# Patient Record
Sex: FEMALE | Race: WHITE | Employment: UNEMPLOYED | URBAN - METROPOLITAN AREA
[De-identification: names, ages, dates, MRNs, and addresses within clinical notes are randomized per-mention and may not be internally consistent; named-entity substitution may affect disease eponyms.]

---

## 2017-04-14 ENCOUNTER — TELEPHONE (OUTPATIENT)
Dept: NURSING | Facility: CLINIC | Age: 32
End: 2017-04-14

## 2017-04-14 DIAGNOSIS — B00.9 HSV (HERPES SIMPLEX VIRUS) INFECTION: ICD-10-CM

## 2017-04-14 RX ORDER — VALACYCLOVIR HYDROCHLORIDE 1 G/1
TABLET, FILM COATED ORAL
Qty: 12 TABLET | Refills: 2 | Status: SHIPPED | OUTPATIENT
Start: 2017-04-14 | End: 2017-07-19

## 2017-04-14 NOTE — TELEPHONE ENCOUNTER
Valacyclovir 1000mg      Last Written Prescription Date:  10/16/15  Last Fill Quantity: 12,   # refills: 2  Last Office Visit with [facility] primary care provider:  10/28/16-PP visit  Future Office visit: none    Pt up to date with annual, Rx refilled.

## 2017-07-19 DIAGNOSIS — B00.9 HSV (HERPES SIMPLEX VIRUS) INFECTION: ICD-10-CM

## 2017-07-20 NOTE — TELEPHONE ENCOUNTER
Pending Prescriptions:                       Disp   Refills    valACYclovir (VALTREX) 1000 mg tablet [Pha*12 tab*0        Sig: TAKE 2 TABLETS BY MOUTH EVERY 12 HOURS FOR 3 DAYS    Last Written Prescription Date: 04/14/17  Last Fill Quantity: 12, # refills: 2  Last Office Visit with FMKIANNA, RAHEEMP or [facility] prescribing provider: 10/28/16      Future visit: None

## 2017-07-20 NOTE — TELEPHONE ENCOUNTER
Would like prescription sent to this pharmacy   [id number] [place] - [id number]

## 2017-07-24 NOTE — TELEPHONE ENCOUNTER
Last visit was pt's postpartum on 10/28/16. Rx last prescribed for Valtrex 1000 MG, 12 tabs/2rf on 4/14/17.     LM to call back (PHI on consent) asking to call back to see if she has used all the refills already?

## 2017-07-27 NOTE — TELEPHONE ENCOUNTER
Pending Prescriptions:                       Disp   Refills    valACYclovir (VALTREX) 1000 mg tablet [Ph*12 tab*0            Sig: TAKE 2 TABLETS BY MOUTH EVERY 12 HOURS FOR 3 DAYS       Last Written Prescription Date: 4/14/17  Last Fill Quantity: 12, # refills: 2  Last Office Visit with [facility], [facility] or [facility] prescribing provider: 10/28/2016 POST PARTUM   RTC: None

## 2017-07-27 NOTE — TELEPHONE ENCOUNTER
[phone]. Please call patient asap.   She still has not received script. Needs to be sent to pharmacy in [place] now.

## 2017-07-27 NOTE — TELEPHONE ENCOUNTER
Pt called to see if she had used all of her refills of valacyclovir. Pt stated she had lost one of her bottles so needs to have another refill. Pt is requesting to go from 12 pill to 24 pill. Pt states that when she has break out she uses all 12 pills and then has to go back to the pharmacy. Pt states she is more prone to cold sore in the summer with the sun exposure. Routing to Dr. James. Ok to send refill for 24 tabs ? How many refills would you like included.

## 2017-07-28 RX ORDER — VALACYCLOVIR HYDROCHLORIDE 1 G/1
TABLET, FILM COATED ORAL
Qty: 24 TABLET | Refills: 1 | Status: SHIPPED | OUTPATIENT
Start: 2017-07-28 | End: 2018-01-28

## 2017-10-18 ENCOUNTER — ALLIED HEALTH/NURSE VISIT (OUTPATIENT)
Dept: NURSING | Facility: CLINIC | Age: 32
End: 2017-10-18
Payer: COMMERCIAL

## 2017-10-18 DIAGNOSIS — Z23 NEED FOR PROPHYLACTIC VACCINATION AND INOCULATION AGAINST INFLUENZA: Primary | ICD-10-CM

## 2017-10-18 PROCEDURE — 90686 IIV4 VACC NO PRSV 0.5 ML IM: CPT

## 2017-10-18 PROCEDURE — 90471 IMMUNIZATION ADMIN: CPT

## 2017-10-18 NOTE — MR AVS SNAPSHOT
"              After Visit Summary   10/18/2017    Mayelin Alexander    MRN: [id number]           Patient Information     Date Of Birth          1985        Visit Information        Provider Department      10/18/2017 10:15 AM [facility] CLINIC NURSE [facility]        Today's Diagnoses     Need for prophylactic vaccination and inoculation against influenza    -  1       Follow-ups after your visit        Who to contact     If you have questions or need follow up information about today's clinic visit or your schedule please contact [facility] directly at [phone].  Normal or non-critical lab and imaging results will be communicated to you by [email or web address], letter or phone within 4 business days after the clinic has received the results. If you do not hear from us within 7 days, please contact the clinic through Sonarworkst or phone. If you have a critical or abnormal lab result, we will notify you by phone as soon as possible.  Submit refill requests through Cyber-Rain or call your pharmacy and they will forward the refill request to us. Please allow 3 business days for your refill to be completed.          Additional Information About Your Visit        MyChart Information     Cyber-Rain lets you send messages to your doctor, view your test results, renew your prescriptions, schedule appointments and more. To sign up, go to [email or web address] . Click on \"Log in\" on the left side of the screen, which will take you to the Welcome page. Then click on \"Sign up Now\" on the right side of the page.     You will be asked to enter the access code listed below, as well as some personal information. Please follow the directions to create your username and password.     Your access code is: [email or web address]  Expires: 2018 10:32 AM     Your access code will  in 90 days. If you need help or a new code, please call your [facility] or [phone].        Care EveryWhere ID     This is " your Care EveryWhere ID. This could be used by other organizations to access your [facility] medical records  [id number]         Blood Pressure from Last 3 Encounters:   10/14/16 116/64   09/12/16 100/65   09/06/16 104/68    Weight from Last 3 Encounters:   10/28/16 137 lb (62.1 kg)   10/14/16 140 lb (63.5 kg)   09/10/16 160 lb (72.6 kg)              We Performed the Following     FLU VAC, SPLIT VIRUS IM > 3 YO (QUADRIVALENT) [88969]     Vaccine Administration, Initial [23831]        Primary Care Provider Office Phone # Fax #    Rach James [phone] [address] [address]        Equal Access to Services     ANDREY PASCUAL : Hadii jessica dickerson hadasho Soomaali, waaxda luqadaha, qaybta kaalmada adeegyada, vanessa sterling . So [facility] [phone].    ATENCIÓN: Si habla español, tiene a daniels disposición servicios gratuitos de asistencia lingüística. Llame al [phone].    We comply with applicable federal civil rights laws and [place] laws. We do not discriminate on the basis of race, color, national origin, age, disability, sex, sexual orientation, or gender identity.            Thank you!     Thank you for choosing [facility]  for your care. Our goal is always to provide you with excellent care. Hearing back from our patients is one way we can continue to improve our services. Please take a few minutes to complete the written survey that you may receive in the mail after your visit with us. Thank you!             Your Updated Medication List - Protect others around you: Learn how to safely use, store and throw away your medicines at www.disposemymeds.org.          This list is accurate as of: 10/18/17 10:32 AM.  Always use your most recent med list.                   Brand Name Dispense Instructions for use Diagnosis    FOLIC ACID PO      Take 1 mg by mouth daily        OMEGA-3 FISH OIL PO           prenatal multivitamin plus iron  27-0.8 MG Tabs per tablet      Take 1 tablet by mouth daily.        valACYclovir 1000 mg tablet    VALTREX    24 tablet    TAKE 2 TABLETS BY MOUTH EVERY 12 HOURS FOR 3 DAYS    HSV (herpes simplex virus) infection       VITAMIN D3 PO      Take by mouth daily

## 2017-10-18 NOTE — PROGRESS NOTES
Injectable Influenza Immunization Documentation    1.  Is the person to be vaccinated sick today?   No    2. Does the person to be vaccinated have an allergy to a component   of the vaccine?   No    3. Has the person to be vaccinated ever had a serious reaction   to influenza vaccine in the past?   No    4. Has the person to be vaccinated ever had Guillain-Barré syndrome?   No    Form completed by Lesley Mackenzie [facility]

## 2018-01-28 DIAGNOSIS — B00.9 HSV (HERPES SIMPLEX VIRUS) INFECTION: ICD-10-CM

## 2018-01-29 RX ORDER — VALACYCLOVIR HYDROCHLORIDE 1 G/1
TABLET, FILM COATED ORAL
Qty: 24 TABLET | Refills: 0 | Status: SHIPPED | OUTPATIENT
Start: 2018-01-29 | End: 2018-06-27

## 2018-01-29 NOTE — TELEPHONE ENCOUNTER
valACYclovir (VALTREX) 1000 mg tablet    Last Written Prescription Date:  7/28/17  Last Fill Quantity: 24,   # refills: 1  Last Office Visit with G primary care provider:  10/28/16  Future Office visit: none    Routing refill request to provider for review/approval because:  Pt due for annual. One month extension sent.

## 2018-06-27 DIAGNOSIS — B00.9 HSV (HERPES SIMPLEX VIRUS) INFECTION: ICD-10-CM

## 2018-06-28 RX ORDER — VALACYCLOVIR HYDROCHLORIDE 1 G/1
1000 TABLET, FILM COATED ORAL EVERY 12 HOURS
Qty: 24 TABLET | Refills: 0 | Status: SHIPPED | OUTPATIENT
Start: 2018-06-28 | End: 2018-08-22

## 2018-06-28 NOTE — TELEPHONE ENCOUNTER
"Requested Prescriptions   Pending Prescriptions Disp Refills     valACYclovir (VALTREX) 1000 mg tablet [Pharmacy Med Name: VALACYCLOVIR 1GM TABLETS] 24 tablet 0     Sig: TAKE 2 TABLETS BY MOUTH EVERY 12 HOURS FOR 3 DAYS    Antivirals for Herpes Protocol Failed    6/28/2018  9:12 AM       Failed - Recent (12 mo) or future (30 days) visit within the authorizing provider's specialty    Patient had office visit in the last 12 months or has a visit in the next 30 days with authorizing provider or within the authorizing provider's specialty.  See \"Patient Info\" tab in inbasket, or \"Choose Columns\" in Meds & Orders section of the refill encounter.           Failed - Normal serum creatinine on file in past 12 months    No lab results found.         Passed - Patient is age 12 or older      Valtrex 1000mg   Last Written Prescription Date:  1/29/18  Last Fill Quantity: 24,  # refills: 0   Last office visit: 10/18/2017 with prescribing provider:     Future Office Visit: 8/1/18 annual  Next 5 appointments (look out 90 days)     Aug 01, 2018  2:00 PM CDT   PHYSICAL with Rach James MD   [facility]    [address] [place] [id number] [phone]               Medication is being filled for 1 time refill only due to:  Patient needs to be seen because it has been more than one year since last visit.  "

## 2018-08-22 ENCOUNTER — OFFICE VISIT (OUTPATIENT)
Dept: OBGYN | Facility: CLINIC | Age: 33
End: 2018-08-22
Payer: COMMERCIAL

## 2018-08-22 VITALS
HEART RATE: 80 BPM | WEIGHT: 132.6 LBS | DIASTOLIC BLOOD PRESSURE: 62 MMHG | BODY MASS INDEX: 21.31 KG/M2 | HEIGHT: 66 IN | SYSTOLIC BLOOD PRESSURE: 102 MMHG

## 2018-08-22 DIAGNOSIS — B00.9 HSV (HERPES SIMPLEX VIRUS) INFECTION: ICD-10-CM

## 2018-08-22 DIAGNOSIS — N39.3 SUI (STRESS URINARY INCONTINENCE, FEMALE): ICD-10-CM

## 2018-08-22 DIAGNOSIS — Z01.419 ENCOUNTER FOR GYNECOLOGICAL EXAMINATION WITHOUT ABNORMAL FINDING: Primary | ICD-10-CM

## 2018-08-22 PROCEDURE — 99395 PREV VISIT EST AGE 18-39: CPT | Performed by: OBSTETRICS & GYNECOLOGY

## 2018-08-22 RX ORDER — VALACYCLOVIR HYDROCHLORIDE 1 G/1
1000 TABLET, FILM COATED ORAL EVERY 12 HOURS
Qty: 24 TABLET | Refills: 3 | Status: SHIPPED | OUTPATIENT
Start: 2018-08-22 | End: 2019-10-25

## 2018-08-22 ASSESSMENT — ANXIETY QUESTIONNAIRES
5. BEING SO RESTLESS THAT IT IS HARD TO SIT STILL: NOT AT ALL
IF YOU CHECKED OFF ANY PROBLEMS ON THIS QUESTIONNAIRE, HOW DIFFICULT HAVE THESE PROBLEMS MADE IT FOR YOU TO DO YOUR WORK, TAKE CARE OF THINGS AT HOME, OR GET ALONG WITH OTHER PEOPLE: NOT DIFFICULT AT ALL
GAD7 TOTAL SCORE: 1
3. WORRYING TOO MUCH ABOUT DIFFERENT THINGS: NOT AT ALL
7. FEELING AFRAID AS IF SOMETHING AWFUL MIGHT HAPPEN: NOT AT ALL
1. FEELING NERVOUS, ANXIOUS, OR ON EDGE: NOT AT ALL
6. BECOMING EASILY ANNOYED OR IRRITABLE: SEVERAL DAYS
2. NOT BEING ABLE TO STOP OR CONTROL WORRYING: NOT AT ALL

## 2018-08-22 ASSESSMENT — PATIENT HEALTH QUESTIONNAIRE - PHQ9: 5. POOR APPETITE OR OVEREATING: NOT AT ALL

## 2018-08-22 NOTE — PROGRESS NOTES
Mayelin is a 33 year old  female who presents for annual exam.     Besides routine health maintenance, she has no other health concerns today .    HPI:  Here today for yearly exam --doing well.  Missed annual exam last year.  Regular, monthly menses x 6d.  A bit heavier for the first 3-4d and then tapers quickly.  No intermenstrual bleeding/spotting.  Some cramping.  Occ has bladder symptoms during menses.  +SA --no issues.   has had vasectomy.  No bowel concerns.  Continues to have incontinence issues.  Better than the 6 months following her 2nd and 3rd deliveries but still an issue.  +leaking with exercise, jumping/playing with kids, cough/sneeze, etc.  Wears a liner for pilates class.  No urgency.  Getting 1-2x/night --usually once solely to void.  Has tried PT in the past but has not stuck with it or continued exercises.      ; 3 kids --Cale is 7yo! Hobnuha is almost 5yo and Verónica is 1yo!!  Stay at home mom  -staying active; busy with kids and usually working out 2x/wk with yoga/pilates, light cardio  +SBE --no concerns  PCP -[facility] --sees as needed  -hx cold sores --uses valtrex as needed  -headed out to [place] and [place] [facility] next week for concert!!  First extended trip away from kids :)      GYNECOLOGIC HISTORY:    Patient's last menstrual period was 2018 (exact date).  Her current contraception method is: none.  She  reports that she has never smoked. She has never used smokeless tobacco.    Patient is sexually active.  STD testing offered?  Declined  Last PHQ-9 score on record =   PHQ-9 SCORE 2018   Total Score 2     Last GAD7 score on record =   CECILIO-7 SCORE 2018   Total Score 1     Alcohol Score = 4    HEALTH MAINTENANCE:  Cholesterol: (No results found for: CHOL   Last Mammo: NA, Result: not applicable, Next Mammo: 5 years.   Pap: 2015 Neg, HPV Neg  Colonoscopy:  NA, Result: not applicable, Next Colonoscopy: 15 years.  Dexa:  NA    Health  maintenance updated:  yes    HISTORY:  Obstetric History       T3      L2     SAB0   TAB0   Ectopic0   Multiple0   Live Births2       # Outcome Date GA Lbr Morgan/2nd Weight Sex Delivery Anes PTL Lv   3 Term 09/10/16 39w4d 03:30 / 02:56 7 lb 9 oz (3.43 kg) F  EPI        Name: Verónica Shrestha      Apgar1:  9                Apgar5: 9   2 Term 13 39w4d 03:35 / 03:20 8 lb 1.5 oz (3.67 kg) M  EPI  [place]      Name: Sharla      Apgar1:  9                Apgar5: 9   1 Term 12 40w5d 08:00 / 06:13 9 lb 13 oz (4.451 kg) M CS-LTranv EPI N [place]      Name: Cale      Apgar1:  5                Apgar5: 9          Patient Active Problem List   Diagnosis     CARDIOVASCULAR SCREENING; LDL GOAL LESS THAN 160     Eczema     Cold sore     Past Surgical History:   Procedure Laterality Date      SECTION  2012    Procedure: SECTION; Surgeon:JOESPH STOUT; Location: L+D      Social History   Substance Use Topics     Smoking status: Never Smoker     Smokeless tobacco: Never Used     Alcohol use Yes      Problem (# of Occurrences) Relation (Name,Age of Onset)    Breast Cancer (1) Paternal Grandmother    HEART DISEASE (1) Paternal Grandfather    Hyperlipidemia (1) Paternal Grandfather    Osteoperosis (1) Maternal Grandfather    Ovarian Cancer (1) Other (aunt)    Thyroid Disease (1) Paternal Grandmother            Current Outpatient Prescriptions   Medication Sig     Prenatal Vit-Fe Fumarate-FA (PRENATAL MULTIVITAMIN  PLUS IRON) 27-0.8 MG TABS Take 1 tablet by mouth daily.     valACYclovir (VALTREX) 1000 mg tablet Take 1 tablet (1,000 mg) by mouth every 12 hours NO further refills until seen in clinic     [DISCONTINUED] valACYclovir (VALTREX) 1000 mg tablet Take 1 tablet (1,000 mg) by mouth every 12 hours NO further refills until seen in clinic     No current facility-administered medications for this visit.      Facility-Administered Medications Ordered in Other Visits   Medication      "lidocaine-EPINEPHrine 1.5 %-1:152182 injection     ROPivacaine (NAROPIN) epidural     No Known Allergies    Past medical, surgical, social and family histories were reviewed and updated in EPIC.    ROS:   12 point review of systems negative other than symptoms noted below.  Gastrointestinal: Abdominal Pain and Bloating  Genitourinary: Incontinence and Urgency    EXAM:  /62  Pulse 80  Ht 5' 5.75\" (1.67 m)  Wt 132 lb 9.6 oz (60.1 kg)  LMP 07/30/2018 (Exact Date)  Breastfeeding? No  BMI 21.57 kg/m2   BMI: Body mass index is 21.57 kg/(m^2).    PHYSICAL EXAM:  Constitutional:  Appearance: Well nourished, well developed, alert, in no acute distress  Neck:  Lymph Nodes:  No lymphadenopathy present    Thyroid:  Gland size normal, nontender, no nodules or masses present  on palpation  Chest:  Respiratory Effort:  Breathing unlabored  Cardiovascular:    Heart: Auscultation:  Regular rate, normal rhythm, no murmurs present  Breasts: Inspection of Breasts:  No lymphadenopathy present., Palpation of Breasts and Axillae:  No masses present on palpation, no breast tenderness., Axillary Lymph Nodes:  No lymphadenopathy present. and No nodularity, asymmetry or nipple discharge bilaterally.  Gastrointestinal:   Abdominal Examination:  Abdomen nontender to palpation, tone normal without rigidity or guarding, no masses present, umbilicus without lesions   Liver and Spleen:  No hepatomegaly present, liver nontender to palpation    Hernias:  No hernias present  Lymphatic: Lymph Nodes:  No other lymphadenopathy present  Skin:  General Inspection:  No rashes present, no lesions present, no areas of  discoloration    Genitalia and Groin:  No rashes present, no lesions present, no areas of  discoloration, no masses present  Neurologic/Psychiatric:    Mental Status:  Oriented X3     Pelvic Exam:  External Genitalia:     Normal appearance for age, no discharge present, no tenderness present, no inflammatory lesions present, color " normal  Vagina:     Normal vaginal vault without central or paravaginal defects, no discharge present, no inflammatory lesions present, no masses present  Bladder:     Nontender to palpation  Urethra:   Urethral Body:  Urethra palpation normal, urethra structural support normal   Urethral Meatus:  No erythema or lesions present  Cervix:     Appearance healthy, no lesions present, nontender to palpation, no bleeding present  Uterus:     Uterus: firm, normal sized and nontender, anteverted in position.   Adnexa:     No adnexal tenderness present, no adnexal masses present  Perineum:     Perineum within normal limits, no evidence of trauma, no rashes or skin lesions present  Anus:     Anus within normal limits, no hemorrhoids present  Inguinal Lymph Nodes:     No lymphadenopathy present  Pubic Hair:     Normal pubic hair distribution for age  Genitalia and Groin:     No rashes present, no lesions present, no areas of discoloration, no masses present      COUNSELING:   Reviewed preventive health counseling, as reflected in patient instructions  Special attention given to:        Regular exercise       Healthy diet/nutrition    BMI: Body mass index is 21.57 kg/(m^2).      ASSESSMENT:  33 year old female with satisfactory annual exam.    ICD-10-CM    1. Encounter for gynecological examination without abnormal finding Z01.419 Pap imaged thin layer screen with HPV - recommended age 30 - 65     HPV High Risk Types DNA Cervical   2. HSV (herpes simplex virus) infection B00.9 valACYclovir (VALTREX) 1000 mg tablet       PLAN:  Patient Instructions   Follow up with your primary care provider for your other medical problems.  Continue self breast exam.  Increase physical activity and exercise.  Usual safety and preventative measures counseling done.  Last pap smear (2015) was normal and negative for the DNA of high risk HPV subtypes.  No pap was obtained this year.  This was discussed with the patient and she agrees with the  plan.  Discussed MEMO again in detail; discussed possible treatment options including PT, home exercises, bladder training and midurethral sling.  Understands the importance of maximizing our non-surgical options prior to surgical treatment given her younger age.  Will contact us as needed.    Rach James MD

## 2018-08-22 NOTE — PATIENT INSTRUCTIONS
Follow up with your primary care provider for your other medical problems.  Continue self breast exam.  Increase physical activity and exercise.  Usual safety and preventative measures counseling done.  Last pap smear (2015) was normal and negative for the DNA of high risk HPV subtypes.  No pap was obtained this year.  This was discussed with the patient and she agrees with the plan.

## 2018-08-22 NOTE — MR AVS SNAPSHOT
After Visit Summary   8/22/2018    Mayelin Alexander    MRN: [id number]           Patient Information     Date Of Birth          1985        Visit Information        Provider Department      8/22/2018 1:50 PM Rach James MD [facility]        Today's Diagnoses     Encounter for gynecological examination without abnormal finding    -  1    HSV (herpes simplex virus) infection        MEMO (stress urinary incontinence, female)          Care Instructions    Follow up with your primary care provider for your other medical problems.  Continue self breast exam.  Increase physical activity and exercise.  Usual safety and preventative measures counseling done.  Last pap smear (2015) was normal and negative for the DNA of high risk HPV subtypes.  No pap was obtained this year.  This was discussed with the patient and she agrees with the plan.          Follow-ups after your visit        Follow-up notes from your care team     Return in about 1 year (around 8/22/2019) for Annual Exam.      Who to contact     If you have questions or need follow up information about today's clinic visit or your schedule please contact [facility] directly at [phone].  Normal or non-critical lab and imaging results will be communicated to you by MyChart, letter or phone within 4 business days after the clinic has received the results. If you do not hear from us within 7 days, please contact the clinic through MyChart or phone. If you have a critical or abnormal lab result, we will notify you by phone as soon as possible.  Submit refill requests through Terascala or call your pharmacy and they will forward the refill request to us. Please allow 3 business days for your refill to be completed.          Additional Information About Your Visit        Care EveryWhere ID     This is your Care EveryWhere ID. This could be used by other organizations to access your [facility]  "records  [id number]        Your Vitals Were     Pulse Height Last Period Breastfeeding? BMI (Body Mass Index)       80 5' 5.75\" (1.67 m) 07/30/2018 (Exact Date) No 21.57 kg/m2        Blood Pressure from Last 3 Encounters:   08/22/18 102/62   10/14/16 116/64   09/12/16 100/65    Weight from Last 3 Encounters:   08/22/18 132 lb 9.6 oz (60.1 kg)   10/28/16 137 lb (62.1 kg)   10/14/16 140 lb (63.5 kg)              We Performed the Following     HPV High Risk Types DNA Cervical     Pap imaged thin layer screen with HPV - recommended age 30 - 65          Where to get your medicines      These medications were sent to [facility] [address] [facility] [address] [place] [address] [address] AT [facility] [address], [facility] [place] [address]    Hours:  24-hours Phone:  [phone]     valACYclovir 1000 mg tablet          Primary Care Provider Office Phone # Fax #    [facility] [phone]       [address] [place] [id number]        Equal Access to Services     ANDREY PASCUAL AH: Hadii jessica dickerson hadasho Soomaali, waaxda luqadaha, qaybta kaalmada adeegyada, vanessa gonzalez. So [facility] [phone].    ATENCIÓN: Si habla español, tiene a daniels disposición servicios gratuitos de asistencia lingüística. [facility] [phone].    We comply with applicable federal civil rights laws and [place] laws. We do not discriminate on the basis of race, color, national origin, age, disability, sex, sexual orientation, or gender identity.            Thank you!     Thank you for choosing [facility]  for your care. Our goal is always to provide you with excellent care. Hearing back from our patients is one way we can continue to improve our services. Please take a few minutes to complete the written survey that you may receive in the mail after your visit with us. Thank you!             Your Updated Medication List - Protect others around you: " Learn how to safely use, store and throw away your medicines at www.disposemymeds.org.          This list is accurate as of 8/22/18  5:48 PM.  Always use your most recent med list.                   Brand Name Dispense Instructions for use Diagnosis    prenatal multivitamin plus iron 27-0.8 MG Tabs per tablet      Take 1 tablet by mouth daily.        valACYclovir 1000 mg tablet    VALTREX    24 tablet    Take 1 tablet (1,000 mg) by mouth every 12 hours NO further refills until seen in clinic    HSV (herpes simplex virus) infection

## 2018-08-24 ASSESSMENT — PATIENT HEALTH QUESTIONNAIRE - PHQ9: SUM OF ALL RESPONSES TO PHQ QUESTIONS 1-9: 2

## 2018-08-24 ASSESSMENT — ANXIETY QUESTIONNAIRES: GAD7 TOTAL SCORE: 1

## 2018-10-15 ENCOUNTER — OFFICE VISIT (OUTPATIENT)
Dept: FAMILY MEDICINE | Facility: CLINIC | Age: 33
End: 2018-10-15
Payer: COMMERCIAL

## 2018-10-15 DIAGNOSIS — R30.0 DYSURIA: ICD-10-CM

## 2018-10-15 DIAGNOSIS — L30.9 ECZEMA, UNSPECIFIED TYPE: ICD-10-CM

## 2018-10-15 DIAGNOSIS — L30.9 DERMATITIS: ICD-10-CM

## 2018-10-15 DIAGNOSIS — N39.0 URINARY TRACT INFECTION WITHOUT HEMATURIA, SITE UNSPECIFIED: Primary | ICD-10-CM

## 2018-10-15 LAB
ALBUMIN UR-MCNC: NEGATIVE MG/DL
APPEARANCE UR: CLEAR
BACTERIA #/AREA URNS HPF: ABNORMAL /HPF
BILIRUB UR QL STRIP: NEGATIVE
COLOR UR AUTO: YELLOW
GLUCOSE UR STRIP-MCNC: NEGATIVE MG/DL
HGB UR QL STRIP: ABNORMAL
KETONES UR STRIP-MCNC: NEGATIVE MG/DL
LEUKOCYTE ESTERASE UR QL STRIP: ABNORMAL
NITRATE UR QL: NEGATIVE
PH UR STRIP: 7.5 PH (ref 5–7)
RBC #/AREA URNS AUTO: ABNORMAL /HPF
SOURCE: ABNORMAL
SP GR UR STRIP: 1.01 (ref 1–1.03)
UROBILINOGEN UR STRIP-ACNC: 0.2 EU/DL (ref 0.2–1)
WBC #/AREA URNS AUTO: ABNORMAL /HPF

## 2018-10-15 PROCEDURE — 87088 URINE BACTERIA CULTURE: CPT | Performed by: FAMILY MEDICINE

## 2018-10-15 PROCEDURE — 99213 OFFICE O/P EST LOW 20 MIN: CPT | Performed by: FAMILY MEDICINE

## 2018-10-15 PROCEDURE — 87086 URINE CULTURE/COLONY COUNT: CPT | Performed by: FAMILY MEDICINE

## 2018-10-15 PROCEDURE — 81001 URINALYSIS AUTO W/SCOPE: CPT | Performed by: FAMILY MEDICINE

## 2018-10-15 PROCEDURE — 87186 SC STD MICRODIL/AGAR DIL: CPT | Performed by: FAMILY MEDICINE

## 2018-10-15 RX ORDER — BETAMETHASONE DIPROPIONATE 0.5 MG/G
CREAM TOPICAL
Qty: 15 G | Refills: 0 | Status: SHIPPED | OUTPATIENT
Start: 2018-10-15 | End: 2018-11-27

## 2018-10-15 RX ORDER — SULFAMETHOXAZOLE/TRIMETHOPRIM 800-160 MG
1 TABLET ORAL 2 TIMES DAILY
Qty: 14 TABLET | Refills: 0 | Status: SHIPPED | OUTPATIENT
Start: 2018-10-15 | End: 2018-11-27

## 2018-10-15 NOTE — PROGRESS NOTES
SUBJECTIVE:                                                      Mayelin Alexander is a 33 year old female who presents to clinic today for the following health issues:    URINARY TRACT SYMPTOMS  Onset: 1 day    Description:   Painful urination (Dysuria): YES  Blood in urine (Hematuria): YES  Delay in urine (Hesitency): no     Intensity: moderate    Progression of Symptoms:  worsening    Accompanying Signs & Symptoms:  Fever/chills: YES- last night maybe. Subjective fever   Flank pain YES- left   Nausea and vomiting: no   Any vaginal symptoms: none  Abdominal/Pelvic Pain: YES    History:   History of frequent UTI's: YES  History of kidney stones: no   Sexually Active: YES  Possibility of pregnancy: No    Precipitating factors:       Therapies Tried and outcome: ibuprofen    Patient's last UTI was 3-4 years ago but she has had 4-5 in her life. Patient reports it took multiple antibiotics to treat at that time.     Rash:  Notices rash on bilateral feet, describes it as burning and itching, more so at night. She has not noticed a rash anywhere else on her body. She returned two weeks ago from being out of the country and is wondering if the rash could have developed there and is concerned about bed bugs. She did not wear new shoes while on her trip but first noticed the rash where her shoes were rubbing her feet. She has used an anti-fungal and hydrocortisone with some symptom relief but rash has not resolved. She has been using a moisturizer.     Problem list and histories reviewed & adjusted, as indicated.  Additional history: as documented    ROS:  Constitutional, HEENT, cardiovascular, pulmonary, GI, , musculoskeletal, neuro, skin, endocrine and psych systems are negative, except as otherwise noted.    This document serves as a record of the services and decisions personally performed by JUAN WHEATLEY. It was created on his/her behalf by Conner Bolton, a trained medical scribe. The creation of this  document is based on the provider's statements to the medical scribe. Conner Bolton, October 15, 2018 9:28 AM  OBJECTIVE:                                                      There were no vitals taken for this visit. There is no height or weight on file to calculate BMI.   GENERAL: healthy, alert, well nourished, well hydrated, no distress  RESP: lungs clear to auscultation - no rales, no rhonchi, no wheezes  CV: regular rates and rhythm, normal S1 S2, no S3 or S4 and no murmur, no click or rub -  ABDOMEN: soft, suprapubic tenderness, no  hepatosplenomegaly, no masses, normal bowel sounds  SKIN: multiple slight pink rased papules on the the posterior heels  BACK: no CVA tenderness, no paralumbar tenderness  PSYCH: Alert and oriented times 3; speech- coherent , normal rate and volume; able to articulate logical thoughts, able to abstract reason, no tangential thoughts, no hallucinations or delusions, affect- normal    Diagnostic test results:  Results for orders placed or performed in visit on 10/15/18 (from the past 24 hour(s))   *UA reflex to Microscopic and Culture ([place] and [facility] (except [place] and [place])   Result Value Ref [place]    Color Urine Yellow     Appearance Urine Clear     Glucose Urine Negative NEG^Negative mg/dL    Bilirubin Urine Negative NEG^Negative    Ketones Urine Negative NEG^Negative mg/dL    Specific Gravity Urine 1.010 1.003 - 1.035    Blood Urine Large (A) NEG^Negative    pH Urine 7.5 (H) 5.0 - 7.0 pH    Protein Albumin Urine Negative NEG^Negative mg/dL    Urobilinogen Urine 0.2 0.2 - 1.0 EU/dL    Nitrite Urine Negative NEG^Negative    Leukocyte Esterase Urine Small (A) NEG^Negative    Source Midstream Urine    Urine Microscopic   Result Value Ref [place]    WBC Urine 10-25 (A) OTO5^0 - 5 /HPF    RBC Urine 10-25 (A) OTO2^O - 2 /HPF    Bacteria Urine Moderate (A) NEG^Negative /HPF       ASSESSMENT/PLAN:                                                      Mayelin was seen  today for uti.    Diagnoses and all orders for this visit:    Urinary tract infection without hematuria, site unspecified  -     sulfamethoxazole-trimethoprim (BACTRIM DS/SEPTRA DS) 800-160 MG per tablet; Take 1 tablet by mouth 2 times daily    Discussed medication schedule, UTI prevention. Follow-up warnings provided   -     *UA reflex to Microscopic and Culture ([place] and [facility] (except [place] and [place])  -     Urine Microscopic  UC pending    Dermatitis of heels / eczema hands  -     betamethasone dipropionate (DIPROSONE) 0.05 % cream; Apply sparingly to affected area twice daily for 14 days.  Do not apply to face.          Risks, benefits and alternatives of treatments discussed. Plan agreed on.      Followup: Return in about 5 days (around 10/20/2018), or if symptoms worsen or fail to improve.    See patient instructions.     The information in this document, created by the medical scribe for me, accurately reflects the services I personally performed and the decisions made by me. I have reviewed and approved this document for accuracy.            Jeffry Soares MD   Pager: [phone]

## 2018-10-15 NOTE — PATIENT INSTRUCTIONS
Use the antibiotic twice daily for at least 5 days. If you are feeling better at day 5 you can stop or you can take all 7 days.

## 2018-10-15 NOTE — LETTER
[facility]  [address] [place] [address]                  [phone]   October 19, 2018    Mayelin Alexander  [address] [address]      Dear Mayelin,    Here is a summary of your recent test results:    -Urine culture is abnormal and grew out bacteria that are sensitive to the antibiotic you have been given.  Complete the medication as prescribed and if you experience new, worsening or persistent symptoms, you should call or return for a recheck.     For additional lab test information, labtestsonline.org is an excellent reference.     Your test results are enclosed.      Please contact me if you have any questions.    In addition, here is a list of due or overdue Health Maintenance reminders.    Health Maintenance Due   Topic Date Due     Cholesterol Lab - yearly  08/19/1984     Flu Vaccine (1) 09/01/2018       Please call us at [phone] (or use "[employer] to address the above recommendations.            Thank you very much for trusting [facility]..     Healthy regards,          Jeffry Soares M.D.        Results for orders placed or performed in visit on 10/15/18   *UA reflex to Microscopic and Culture ([place] and [facility] (except [place] and [place])   Result Value Ref [place]    Color Urine Yellow     Appearance Urine Clear     Glucose Urine Negative NEG^Negative mg/dL    Bilirubin Urine Negative NEG^Negative    Ketones Urine Negative NEG^Negative mg/dL    Specific Gravity Urine 1.010 1.003 - 1.035    Blood Urine Large (A) NEG^Negative    pH Urine 7.5 (H) 5.0 - 7.0 pH    Protein Albumin Urine Negative NEG^Negative mg/dL    Urobilinogen Urine 0.2 0.2 - 1.0 EU/dL    Nitrite Urine Negative NEG^Negative    Leukocyte Esterase Urine Small (A) NEG^Negative    Source Midstream Urine    Urine Microscopic   Result Value Ref [place]    WBC Urine 10-25 (A) OTO5^0 - 5 /HPF    RBC Urine 10-25 (A) OTO2^O - 2 /HPF    Bacteria  Urine Moderate (A) NEG^Negative /HPF   Urine Culture Aerobic Bacterial   Result Value Ref [facility]    Specimen Description Midstream Urine     Culture Micro >100,000 colonies/mL  Escherichia coli   (A)        Susceptibility    Escherichia coli - PAMELA     AMPICILLIN <=2 Sensitive ug/mL     CEFAZOLIN* <=4 Sensitive ug/mL      * Cefazolin PAMELA breakpoints are for the treatment of uncomplicated urinary tract infections.  For the treatment of systemic infections, please contact the laboratory for additional testing.     CEFOXITIN <=4 Sensitive ug/mL     CEFTAZIDIME <=1 Sensitive ug/mL     CEFTRIAXONE <=1 Sensitive ug/mL     CIPROFLOXACIN <=0.25 Sensitive ug/mL     GENTAMICIN <=1 Sensitive ug/mL     LEVOFLOXACIN <=0.12 Sensitive ug/mL     NITROFURANTOIN <=16 Sensitive ug/mL     TOBRAMYCIN <=1 Sensitive ug/mL     Trimethoprim/Sulfa <=1/19 Sensitive ug/mL     AMPICILLIN/SULBACTAM <=2 Sensitive ug/mL     Piperacillin/Tazo <=4 Sensitive ug/mL     CEFEPIME <=1 Sensitive ug/mL

## 2018-10-15 NOTE — MR AVS SNAPSHOT
After Visit Summary   10/15/2018    Mayelin Alexander    MRN: [id number]           Patient Information     Date Of Birth          1985        Visit Information        Provider Department      10/15/2018 9:00 AM Naldo Soares MD [facility]        Today's Diagnoses     Urinary tract infection without hematuria, site unspecified    -  1    Dysuria        Dermatitis          Care Instructions    Use the antibiotic twice daily for at least 5 days. If you are feeling better at day 5 you can stop or you can take all 7 days.           Follow-ups after your visit        Follow-up notes from your care team     Return in about 5 days (around 10/20/2018), or if symptoms worsen or fail to improve.      Who to contact     If you have questions or need follow up information about today's clinic visit or your schedule please contact [facility] directly at [phone].  Normal or non-critical lab and imaging results will be communicated to you by MyChart, letter or phone within 4 business days after the clinic has received the results. If you do not hear from us within 7 days, please contact the clinic through MyChart or phone. If you have a critical or abnormal lab result, we will notify you by phone as soon as possible.  Submit refill requests through [employer] or call your pharmacy and they will forward the refill request to us. Please allow 3 business days for your refill to be completed.          Additional Information About Your Visit        Care EveryWhere ID     This is your Care EveryWhere ID. This could be used by other organizations to access your [place] medical records  [id number]         Blood Pressure from Last 3 Encounters:   08/22/18 102/62   10/14/16 116/64   09/12/16 100/65    Weight from Last 3 Encounters:   08/22/18 132 lb 9.6 oz (60.1 kg)   10/28/16 137 lb (62.1 kg)   10/14/16 140 lb (63.5 kg)              We Performed the Following     *UA reflex  to Microscopic and Culture ([place] and [facility] (except [place] and [place])     Urine Culture Aerobic Bacterial     Urine Microscopic          Today's Medication Changes          These changes are accurate as of 10/15/18  9:39 AM.  If you have any questions, ask your nurse or doctor.               Start taking these medicines.        Dose/Directions    betamethasone dipropionate 0.05 % cream   Commonly known as:  DIPROSONE   Used for:  Dermatitis   Started by:  Naldo Soares MD        Apply sparingly to affected area twice daily for 14 days.  Do not apply to face.   Quantity:  15 g   Refills:  0       sulfamethoxazole-trimethoprim 800-160 MG per tablet   Commonly known as:  BACTRIM DS/SEPTRA DS   Used for:  Urinary tract infection without hematuria, site unspecified   Started by:  Naldo Soares MD        Dose:  1 tablet   Take 1 tablet by mouth 2 times daily   Quantity:  14 tablet   Refills:  0            Where to get your medicines      These medications were sent to [facility] [address] [place] [address] AT [facility] [address] [address]    Hours:  24-hours Phone:  [phone]     betamethasone dipropionate 0.05 % cream    sulfamethoxazole-trimethoprim 800-160 MG per tablet                Primary Care Provider Office Phone # Fax #    [facility] [phone]       [address] [place] [address]        Equal Access to Services     ANDREY PASCUAL AH: Hadii jessica dickerson hadasho Soomaali, waaxda luqadaha, qaybta kaalmada adeegyada, vanessa sterling . So [facility] [phone].    ATENCIÓN: Si habla español, tiene a daniels disposición servicios gratuitos de asistencia lingüística. Rashad al [phone].    We comply with applicable federal civil rights laws and [place] laws. We do not discriminate on the basis of race, color, national origin, age, disability, sex, sexual  orientation, or gender identity.            Thank you!     Thank you for choosing [facility]  for your care. Our goal is always to provide you with excellent care. Hearing back from our patients is one way we can continue to improve our services. Please take a few minutes to complete the written survey that you may receive in the mail after your visit with us. Thank you!             Your Updated Medication List - Protect others around you: Learn how to safely use, store and throw away your medicines at www.disposemymeds.org.          This list is accurate as of 10/15/18  9:39 AM.  Always use your most recent med list.                   Brand Name Dispense Instructions for use Diagnosis    betamethasone dipropionate 0.05 % cream    DIPROSONE    15 g    Apply sparingly to affected area twice daily for 14 days.  Do not apply to face.    Dermatitis       prenatal multivitamin plus iron 27-0.8 MG Tabs per tablet      Take 1 tablet by mouth daily.        sulfamethoxazole-trimethoprim 800-160 MG per tablet    BACTRIM DS/SEPTRA DS    14 tablet    Take 1 tablet by mouth 2 times daily    Urinary tract infection without hematuria, site unspecified       valACYclovir 1000 mg tablet    VALTREX    24 tablet    Take 1 tablet (1,000 mg) by mouth every 12 hours NO further refills until seen in clinic    HSV (herpes simplex virus) infection

## 2018-10-16 LAB
BACTERIA SPEC CULT: ABNORMAL
SPECIMEN SOURCE: ABNORMAL

## 2018-10-18 NOTE — PROGRESS NOTES
Note to Staff: please call the patient to explain results. and call the patient to check on current symptoms.    -Urine culture is abnormal and grew out bacteria that are sensitive to the antibiotic you have been given.  Complete the medication as prescribed and if you experience new, worsening or persistent symptoms, you should call or return for a recheck.     For additional lab test information, labtestsonline.org is an excellent reference.

## 2018-10-22 ENCOUNTER — TELEPHONE (OUTPATIENT)
Dept: FAMILY MEDICINE | Facility: CLINIC | Age: 33
End: 2018-10-22

## 2018-10-22 NOTE — TELEPHONE ENCOUNTER
Reason for Call:  Other returning call    Detailed comments: Pt returning call for triage    Phone Number Patient can be reached at: Home number on file [phone] (home)    Best Time:   anytime    Can we leave a detailed message on this number? YES    Call taken on 10/22/2018 at 9:42 AM by Machelel Porras

## 2018-10-23 NOTE — TELEPHONE ENCOUNTER
Patient returned call.  Patient completed antibiotics and is not experiencing any other symptoms.    Jewels THOMASON RN  [employer] Triage

## 2018-11-26 ENCOUNTER — NURSE TRIAGE (OUTPATIENT)
Dept: NURSING | Facility: CLINIC | Age: 33
End: 2018-11-26

## 2018-11-27 ENCOUNTER — TELEPHONE (OUTPATIENT)
Dept: FAMILY MEDICINE | Facility: CLINIC | Age: 33
End: 2018-11-27

## 2018-11-27 ENCOUNTER — OFFICE VISIT (OUTPATIENT)
Dept: FAMILY MEDICINE | Facility: CLINIC | Age: 33
End: 2018-11-27
Payer: COMMERCIAL

## 2018-11-27 VITALS
WEIGHT: 134.4 LBS | BODY MASS INDEX: 22.39 KG/M2 | HEIGHT: 65 IN | TEMPERATURE: 99.4 F | SYSTOLIC BLOOD PRESSURE: 102 MMHG | DIASTOLIC BLOOD PRESSURE: 72 MMHG | OXYGEN SATURATION: 97 % | HEART RATE: 106 BPM

## 2018-11-27 DIAGNOSIS — Z87.440 RECENT URINARY TRACT INFECTION: ICD-10-CM

## 2018-11-27 DIAGNOSIS — H65.92 OME (OTITIS MEDIA WITH EFFUSION), LEFT: ICD-10-CM

## 2018-11-27 DIAGNOSIS — R35.0 URINARY FREQUENCY: ICD-10-CM

## 2018-11-27 DIAGNOSIS — R05.9 COUGH: Primary | ICD-10-CM

## 2018-11-27 DIAGNOSIS — J02.9 SORE THROAT: ICD-10-CM

## 2018-11-27 DIAGNOSIS — H66.92 OTITIS MEDIA TREATED WITH ANTIBIOTICS IN THE PAST 60 DAYS, LEFT: ICD-10-CM

## 2018-11-27 LAB
ALBUMIN UR-MCNC: 30 MG/DL
APPEARANCE UR: CLEAR
BILIRUB UR QL STRIP: NEGATIVE
COLOR UR AUTO: YELLOW
DEPRECATED S PYO AG THROAT QL EIA: NORMAL
FLUAV+FLUBV AG SPEC QL: NEGATIVE
FLUAV+FLUBV AG SPEC QL: NEGATIVE
GLUCOSE UR STRIP-MCNC: NEGATIVE MG/DL
HGB UR QL STRIP: ABNORMAL
KETONES UR STRIP-MCNC: >=80 MG/DL
LEUKOCYTE ESTERASE UR QL STRIP: NEGATIVE
MUCOUS THREADS #/AREA URNS LPF: PRESENT /LPF
NITRATE UR QL: NEGATIVE
NON-SQ EPI CELLS #/AREA URNS LPF: ABNORMAL /LPF
PH UR STRIP: 6 PH (ref 5–7)
RBC #/AREA URNS AUTO: ABNORMAL /HPF
SOURCE: ABNORMAL
SP GR UR STRIP: 1.02 (ref 1–1.03)
SPECIMEN SOURCE: NORMAL
SPECIMEN SOURCE: NORMAL
UROBILINOGEN UR STRIP-ACNC: 0.2 EU/DL (ref 0.2–1)
WBC #/AREA URNS AUTO: ABNORMAL /HPF

## 2018-11-27 PROCEDURE — 87081 CULTURE SCREEN ONLY: CPT | Mod: 59 | Performed by: PHYSICIAN ASSISTANT

## 2018-11-27 PROCEDURE — 81001 URINALYSIS AUTO W/SCOPE: CPT | Performed by: PHYSICIAN ASSISTANT

## 2018-11-27 PROCEDURE — 87804 INFLUENZA ASSAY W/OPTIC: CPT | Performed by: PHYSICIAN ASSISTANT

## 2018-11-27 PROCEDURE — 87880 STREP A ASSAY W/OPTIC: CPT | Performed by: PHYSICIAN ASSISTANT

## 2018-11-27 PROCEDURE — 87086 URINE CULTURE/COLONY COUNT: CPT | Performed by: PHYSICIAN ASSISTANT

## 2018-11-27 PROCEDURE — 99214 OFFICE O/P EST MOD 30 MIN: CPT | Performed by: PHYSICIAN ASSISTANT

## 2018-11-27 RX ORDER — CEPHALEXIN 500 MG/1
500 CAPSULE ORAL 2 TIMES DAILY
Qty: 14 CAPSULE | Refills: 0 | Status: SHIPPED | OUTPATIENT
Start: 2018-11-27 | End: 2018-12-04

## 2018-11-27 NOTE — PROGRESS NOTES
SUBJECTIVE:   Mayelin Alexander is a 33 year old female who presents to clinic today for the following health issues.    Acute Illness   Acute illness concerns?- fever, cough  Onset: x3 days    Fever YES- up to 103    Chills/Sweats: YES- both    Headache (location?):  YES- all over, back ache    Sinus Pressure: YES- facial pain    Conjunctivitis:  no    Ear Pain:  no    Rhinorrhea:  no    Congestion:  YES- chest feels heavy    Sore Throat:  YES- a little   Cough:   YES-productive of sputum, with shortness of breath    Wheeze:  No, but breathing feels labored    Decreased Appetite: YES    Nausea:  YES- a little    Vomiting:  no    Diarrhea:  no    Dysuria/Freq.:  no    Fatigue/Achiness: YES- both    Sick/Strep Exposure:  YES- kids have been sick   Therapies Tried and outcome: ibuprofen    Pt reports main sources of pain are head and back. Back pain is mainly in the middle and L>R.  Pt relates pain in back to excessive coughing.  Last dose of Ibuprofen was at 2 am and only provided moderate relief. Has a Hx of childhood asthma.     Denies urinary symptoms. Had a UTI a month ago (culture positive e-coli) and took medication in its entirety. Has urgency, however, reports that this has been present since her children were born. She is currently at the end of her period, reports her blood looks different than typical (lighter).  Last dose of Bactrim 10/22/2018.        Problem list and histories reviewed & adjusted, as indicated.  Additional history: as documented    Patient Active Problem List   Diagnosis     Eczema     MEMO (stress urinary incontinence, female)     Cold sore     Past Surgical History:   Procedure Laterality Date      SECTION  2012    Procedure: SECTION; Surgeon:JOESPH STOUT; Location: L+D       Social History   Substance Use Topics     Smoking status: Never Smoker     Smokeless tobacco: Never Used     Alcohol use Yes     Family History   Problem Relation Age of Onset      "Osteoporosis Maternal Grandfather      Breast Cancer Paternal Grandmother      Thyroid Disease Paternal Grandmother      Hyperlipidemia Paternal Grandfather      HEART DISEASE Paternal Grandfather      Ovarian Cancer Other          Current Outpatient Prescriptions   Medication Sig Dispense Refill     cephALEXin (KEFLEX) 500 MG capsule Take 1 capsule (500 mg) by mouth 2 times daily for 7 days 14 capsule 0     Prenatal Vit-Fe Fumarate-FA (PRENATAL MULTIVITAMIN  PLUS IRON) 27-0.8 MG TABS Take 1 tablet by mouth daily.       valACYclovir (VALTREX) 1000 mg tablet Take 1 tablet (1,000 mg) by mouth every 12 hours NO further refills until seen in clinic 24 tablet 3     No Known Allergies    Reviewed and updated as needed this visit by clinical staff  Tobacco  Allergies  Meds  Problems  Med Hx  Surg Hx  Fam Hx  Soc Hx        Reviewed and updated as needed this visit by Provider  Tobacco  Allergies  Meds  Problems  Med Hx  Surg Hx  Fam Hx  Soc Hx          ROS:  Constitutional, HEENT, cardiovascular, pulmonary, GI, , musculoskeletal, neuro, skin, endocrine and psych systems are negative, except as otherwise noted.    This document serves as a record of the services and decisions personally performed and made by Aaliyah Vargas, MS, PA-C. It was created on her behalf by Cony Wade, a trained medical scribe. The creation of this document is based on the provider's statements to the medical scribe.  Cony Wade November 27, 2018 11:32 AM      OBJECTIVE:   /72 (BP Location: Right arm, Patient Position: Chair, Cuff Size: Adult Regular)  Pulse 106  Temp 99.4  F (37.4  C) (Oral)  Ht 5' 5\" (1.651 m)  Wt 134 lb 6.4 oz (61 kg)  LMP 11/22/2018 (Exact Date)  SpO2 97%  BMI 22.37 kg/m2  Body mass index is 22.37 kg/(m^2).    GENERAL: healthy, alert and no distress  HENT: Ethmoid sinus tenderness, erythematous and retracted left ear, nose and mouth without ulcers or lesions  NECK: no adenopathy, no " asymmetry, masses, or scars and thyroid normal to palpation  RESP: lungs clear to auscultation - no rales, rhonchi or wheezes  CV: regular rate and rhythm, normal S1 S2, no S3 or S4, no murmur, click or rub, no peripheral edema and peripheral pulses strong  MS: no gross musculoskeletal defects noted, no edema  SKIN: no suspicious lesions or rashes  NEURO: Normal strength and tone, mentation intact and speech normal  BACK: no CVA tenderness, no paralumbar tenderness  PSYCH: mentation appears normal, affect normal/bright     Diagnostic Test Results:  Results for orders placed or performed in visit on 11/27/18 (from the past 24 hour(s))   Rapid strep screen   Result Value Ref [facility]    Specimen Description Throat     Rapid Strep A Screen       NEGATIVE: No Group A streptococcal antigen detected by immunoassay, await culture report.   Influenza A/B antigen   Result Value Ref [facility]    Influenza A/B Agn Specimen NASAL     Influenza A Negative NEG^Negative    Influenza B Negative NEG^Negative   *UA reflex to Microscopic and Culture ([place] and [facility] (except [place] and [place])   Result Value Ref [place]    Color Urine Yellow     Appearance Urine Clear     Glucose Urine Negative NEG^Negative mg/dL    Bilirubin Urine Negative NEG^Negative    Ketones Urine >=80 (A) NEG^Negative mg/dL    Specific Gravity Urine 1.025 1.003 - 1.035    Blood Urine Moderate (A) NEG^Negative    pH Urine 6.0 5.0 - 7.0 pH    Protein Albumin Urine 30 (A) NEG^Negative mg/dL    Urobilinogen Urine 0.2 0.2 - 1.0 EU/dL    Nitrite Urine Negative NEG^Negative    Leukocyte Esterase Urine Negative NEG^Negative    Source Midstream Urine    Urine Microscopic   Result Value Ref [place]    WBC Urine 0 - 5 OTO5^0 - 5 /HPF    RBC Urine 10-25 (A) OTO2^O - 2 /HPF    Squamous Epithelial /LPF Urine Moderate (A) FEW^Few /LPF    Mucous Urine Present (A) NEG^Negative /LPF       ASSESSMENT/PLAN:   Mayelin was seen today for flu symptoms.    Diagnoses and all orders  for this visit:    Cough, Sore throat  Rapid strep negative. Influenza A/B antigen negative. Will await culture however informed pt, treatment of OME will also resolve strep.   -     Influenza A/B antigen  -     Rapid strep screen  -     Beta strep group A culture    Urinary frequency  Urine test today showed no significant bacteria. Will culture to ensure UTI ~1 month ago has resolved. Will inform pt of results.   -     *UA reflex to Microscopic and Culture ([place] and [facility] (except [place] and [place])    Otitis media treated with antibiotics in the past 60 days, left, OME (otitis media with effusion), left  Start Keflex 500 mg for otitis media. Pt seemed hesitant to start medication until otitis media worsened as she does not like taking medication. Symptoms could be from a virus, however, otitis present on exam.  Discussed with pt, after starting medication if sx persist or worsen return to clinic. Keep well hydrated.   -     cephALEXin (KEFLEX) 500 MG capsule; Take 1 capsule (500 mg) by mouth 2 times daily for 7 days      Return in about 2 weeks (around 12/11/2018) for flu shot.    The information in this document, created by the medical scribe for me, accurately reflects the services I personally performed and the decisions made by me. I have reviewed and approved this document for accuracy prior to leaving the patient care area.  November 27, 2018 11:32 AM      Aaliyah Vargas MS, PAVidyaC  [facility]

## 2018-11-27 NOTE — PROGRESS NOTES
Please call pt and advise that her urine shows blood but no significant bacteria source.  I will culture it to be sure the bacteria that was present ~1 month ago has completely resolved.  I will keep her apprised of the results.

## 2018-11-27 NOTE — TELEPHONE ENCOUNTER
Reason for Call:  Other call back    Detailed comments: Pt is wanting lab results from [facility]    Phone Number Patient can be reached at: Home number on file [phone] (home)    Best Time: anytime    Can we leave a detailed message on this number? YES    Call taken on 11/27/2018 at 1:25 PM by Machelle Porras

## 2018-11-27 NOTE — TELEPHONE ENCOUNTER
Still in process. Let her know we will call when we get results  Flakita Rojas RN- Triage [employer]

## 2018-11-27 NOTE — TELEPHONE ENCOUNTER
103 forehead temp earlier today, down to 102 after Ibuprofen.  Mild chest discomfort.Will see provider within 24 hours if not improved.  Sherice Montiel RN  [facility]      Reason for Disposition    Fever present > 3 days (72 hours)    Additional Information    Negative: Severe difficulty breathing (e.g., struggling for each breath, speaks in single words)    Negative: Bluish lips, tongue, or face now    Negative: [1] Rapid onset of cough AND [2] has hives    Negative: Coughing started suddenly after medicine, an allergic food or bee sting    Negative: [1] Difficulty breathing AND [2] exposure to flames, smoke, or fumes    Negative: [1] Stridor AND [2] difficulty breathing    Negative: Sounds like a life-threatening emergency to the triager    Negative: Patient sounds very sick or weak to the triager    Negative: Chest pain  (Exception: MILD central chest pain, present only when coughing)    Negative: Difficulty breathing    Negative: [1] Coughed up blood AND [2] > 1 tablespoon (15 ml) (Exception: blood-tinged sputum)    Negative: Fever > 103 F (39.4 C)     Not now.    Negative: [1] Fever > 101 F (38.3 C) AND [2] age > 60    Negative: [1] Fever > 101 F (38.3 C) AND [2] bedridden (e.g., nursing home patient, CVA, chronic illness, recovering from surgery)    Negative: [1] Fever > 100.5 F (38.1 C) AND [2] diabetes mellitus or weak immune system (e.g., HIV positive, cancer chemo, splenectomy, organ transplant, chronic steroids)    Negative: Wheezing is present    Negative: [1] Ankle swelling AND [2] swelling is increasing    Negative: [1] Continuous (nonstop) coughing interferes with work or school AND [2] no improvement using cough treatment per protocol    Negative: SEVERE coughing spells (e.g., whooping sound after coughing, vomiting after coughing)    Protocols used: COUGH - ACUTE NON-PRODUCTIVE-ADULT-

## 2018-11-27 NOTE — MR AVS SNAPSHOT
"              After Visit Summary   11/27/2018    Mayelin Alexander    MRN: [id number]           Patient Information     Date Of Birth          1985        Visit Information        Provider Department      11/27/2018 10:40 AM Aaliyah Vargas PA-C [facility]        Today's Diagnoses     Cough    -  1    Sore throat        Urinary frequency        Otitis media treated with antibiotics in the past 60 days, left        Recent urinary tract infection        OME (otitis media with effusion), left           Follow-ups after your visit        Follow-up notes from your care team     Return in about 2 weeks (around 12/11/2018) for flu shot.      Who to contact     If you have questions or need follow up information about today's clinic visit or your schedule please contact [facility] directly at [phone].  Normal or non-critical lab and imaging results will be communicated to you by MyChart, letter or phone within 4 business days after the clinic has received the results. If you do not hear from us within 7 days, please contact the clinic through MyChart or phone. If you have a critical or abnormal lab result, we will notify you by phone as soon as possible.  Submit refill requests through [employer] or call your pharmacy and they will forward the refill request to us. Please allow 3 business days for your refill to be completed.          Additional Information About Your Visit        MyChart Information     [facility] lets you send messages to your doctor, view your test results, renew your prescriptions, schedule appointments and more. To sign up, go to [email or web address] . Click on \"Log in\" on the left side of the screen, which will take you to the Welcome page. Then click on \"Sign up Now\" on the right side of the page.     You will be asked to enter the access code listed below, as well as some personal information. Please follow the directions to create your username " "and password.     Your access code is: [email or web address]  Expires: 2019 10:18 PM     Your access code will  in 90 days. If you need help or a new code, please call your [facility] clinic or [phone].        Care EveryWhere ID     This is your Care EveryWhere ID. This could be used by other organizations to access your [facility] medical records  [id number]        Your Vitals Were     Pulse Temperature Height Last Period Pulse Oximetry BMI (Body Mass Index)    106 99.4  F (37.4  C) (Oral) 5' 5\" (1.651 m) 2018 (Exact Date) 97% 22.37 kg/m2       Blood Pressure from Last 3 Encounters:   18 102/72   18 102/62   10/14/16 116/64    Weight from Last 3 Encounters:   18 134 lb 6.4 oz (61 kg)   18 132 lb 9.6 oz (60.1 kg)   10/28/16 137 lb (62.1 kg)              We Performed the Following     *UA reflex to Microscopic and Culture ([place] [facility] (except [place] and [place])     Beta strep group A culture     Influenza A/B antigen     Rapid strep screen     Urine Culture Aerobic Bacterial     Urine Microscopic          Today's Medication Changes          These changes are accurate as of 18 10:18 PM.  If you have any questions, ask your nurse or doctor.               Start taking these medicines.        Dose/Directions    cephALEXin 500 MG capsule   Commonly known as:  KEFLEX   Used for:  OME (otitis media with effusion), left   Started by:  Aaliyah Vargas PA-C        Dose:  500 mg   Take 1 capsule (500 mg) by mouth 2 times daily for 7 days   Quantity:  14 capsule   Refills:  0         Stop taking these medicines if you haven't already. Please contact your care team if you have questions.     betamethasone dipropionate 0.05 % external cream   Commonly known as:  DIPROSONE   Stopped by:  Aaliyah Vargas PA-C           sulfamethoxazole-trimethoprim 800-160 MG tablet   Commonly known as:  BACTRIM DS/SEPTRA DS   Stopped by:  Aaliyah Vargas PA-C              "   Where to get your medicines      These medications were sent to [facility] - [place], [place] - [address]  [address], [facility] [address]     Phone:  [phone]     cephALEXin 500 MG capsule                Primary Care Provider Office Phone # Fax #    [facility] [phone]       [address] [place] [address]        Equal Access to Services     ANDREY PASCUAL : Hadii aad ku hadasho Soomaali, waaxda luqadaha, qaybta kaalmada adeegyada, waxay idiin hayaan margot reidaramis gonzalez. So [facility] [phone].    ATENCIÓN: Si habla español, tiene a daniels disposición servicios gratuitos de asistencia lingüística. Rashad al [phone].    We comply with applicable federal civil rights laws and [place] laws. We do not discriminate on the basis of race, color, national origin, age, disability, sex, sexual orientation, or gender identity.            Thank you!     Thank you for choosing [facility]  for your care. Our goal is always to provide you with excellent care. Hearing back from our patients is one way we can continue to improve our services. Please take a few minutes to complete the written survey that you may receive in the mail after your visit with us. Thank you!             Your Updated Medication List - Protect others around you: Learn how to safely use, store and throw away your medicines at www.disposemymeds.org.          This list is accurate as of 11/27/18 10:18 PM.  Always use your most recent med list.                   Brand Name Dispense Instructions for use Diagnosis    cephALEXin 500 MG capsule    KEFLEX    14 capsule    Take 1 capsule (500 mg) by mouth 2 times daily for 7 days    OME (otitis media with effusion), left       pediatric multivitamin w/iron 27-0.8 MG tablet      Take 1 tablet by mouth daily.        valACYclovir 1000 mg tablet    VALTREX    24 tablet    Take 1 tablet (1,000 mg) by mouth  every 12 hours NO further refills until seen in clinic    HSV (herpes simplex virus) infection

## 2018-11-28 LAB
BACTERIA SPEC CULT: NORMAL
BACTERIA SPEC CULT: NORMAL
SPECIMEN SOURCE: NORMAL
SPECIMEN SOURCE: NORMAL

## 2019-10-25 ENCOUNTER — OFFICE VISIT (OUTPATIENT)
Dept: OBGYN | Facility: CLINIC | Age: 34
End: 2019-10-25
Payer: COMMERCIAL

## 2019-10-25 VITALS
SYSTOLIC BLOOD PRESSURE: 104 MMHG | BODY MASS INDEX: 22.16 KG/M2 | DIASTOLIC BLOOD PRESSURE: 60 MMHG | HEIGHT: 65 IN | WEIGHT: 133 LBS

## 2019-10-25 DIAGNOSIS — Z23 NEED FOR PROPHYLACTIC VACCINATION AND INOCULATION AGAINST INFLUENZA: ICD-10-CM

## 2019-10-25 DIAGNOSIS — N92.0 MENORRHAGIA WITH REGULAR CYCLE: ICD-10-CM

## 2019-10-25 DIAGNOSIS — B00.9 HSV (HERPES SIMPLEX VIRUS) INFECTION: ICD-10-CM

## 2019-10-25 DIAGNOSIS — Z01.419 ENCOUNTER FOR GYNECOLOGICAL EXAMINATION WITHOUT ABNORMAL FINDING: Primary | ICD-10-CM

## 2019-10-25 PROCEDURE — 99395 PREV VISIT EST AGE 18-39: CPT | Mod: 25 | Performed by: OBSTETRICS & GYNECOLOGY

## 2019-10-25 PROCEDURE — 90471 IMMUNIZATION ADMIN: CPT | Performed by: OBSTETRICS & GYNECOLOGY

## 2019-10-25 PROCEDURE — 99212 OFFICE O/P EST SF 10 MIN: CPT | Mod: 25 | Performed by: OBSTETRICS & GYNECOLOGY

## 2019-10-25 PROCEDURE — 90686 IIV4 VACC NO PRSV 0.5 ML IM: CPT | Performed by: OBSTETRICS & GYNECOLOGY

## 2019-10-25 RX ORDER — VALACYCLOVIR HYDROCHLORIDE 1 G/1
1000 TABLET, FILM COATED ORAL EVERY 12 HOURS
Qty: 24 TABLET | Refills: 3 | Status: SHIPPED | OUTPATIENT
Start: 2019-10-25 | End: 2021-02-09

## 2019-10-25 ASSESSMENT — ANXIETY QUESTIONNAIRES
7. FEELING AFRAID AS IF SOMETHING AWFUL MIGHT HAPPEN: NOT AT ALL
GAD7 TOTAL SCORE: 2
3. WORRYING TOO MUCH ABOUT DIFFERENT THINGS: NOT AT ALL
IF YOU CHECKED OFF ANY PROBLEMS ON THIS QUESTIONNAIRE, HOW DIFFICULT HAVE THESE PROBLEMS MADE IT FOR YOU TO DO YOUR WORK, TAKE CARE OF THINGS AT HOME, OR GET ALONG WITH OTHER PEOPLE: NOT DIFFICULT AT ALL
6. BECOMING EASILY ANNOYED OR IRRITABLE: NOT AT ALL
2. NOT BEING ABLE TO STOP OR CONTROL WORRYING: NOT AT ALL
1. FEELING NERVOUS, ANXIOUS, OR ON EDGE: SEVERAL DAYS
5. BEING SO RESTLESS THAT IT IS HARD TO SIT STILL: NOT AT ALL

## 2019-10-25 ASSESSMENT — MIFFLIN-ST. JEOR: SCORE: 1294.16

## 2019-10-25 ASSESSMENT — PATIENT HEALTH QUESTIONNAIRE - PHQ9
5. POOR APPETITE OR OVEREATING: SEVERAL DAYS
SUM OF ALL RESPONSES TO PHQ QUESTIONS 1-9: 3

## 2019-10-25 NOTE — PATIENT INSTRUCTIONS
Follow up with your primary care provider for your other medical problems.  Continue self breast exam.  Increase physical activity and exercise.  Usual safety and preventative measures counseling done.  Flu Shot today.  Last pap smear (2015) was normal and negative for the DNA of high risk HPV subtypes.  No pap was obtained this year.  This was discussed with the patient and she agrees with the plan.  Long discussion today regarding management options for heavy and painful menses.  Discussed revisiting either OCPs or nuvaring, discussed IUDs or discussed non-hormonal option of Lysteda for only her heavy months.  Discussed r/b/a of each including effectiveness, schedule of usage, side effects, etc.  Will discuss and review the literature and contact me as needed for either prescription or to schedule IUD placement.

## 2019-10-25 NOTE — PROGRESS NOTES
Mayelin is a 34 year old  female who presents for annual exam.     Besides routine health maintenance, she has no other health concerns today .    HPI:  Here today for yearly exam --doing well.  Regular, monthly menses x 5d.  Cycles a bit shorter at times --26d as opposed to 28d.  Heavier bleeding and significant cramping every few months where she struggles to get out of bed.  Has used pills in the past but didn't feel well; has done well with nuvaring.  +SA --no issues.   has vasectomy.  No bowel issues.  Continues to have MEMO --has worked with PT in the past and has considered re-visiting them.  Notices worsening of leaking around ovulation time.  Wears a pad for yoga but does well most days.      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  Cale -2nd grade; Hobie - and Juniper 4yo  -staying active; belongs to [employer] and trying to get there at least 2x/wk; also walking and doing yoga  +SBE --no concerns  PCP -[facility] --sees as needed  -agrees to flu shot today      GYNECOLOGIC HISTORY:    Patient's last menstrual period was 10/08/2019 (approximate).    Regular menses? yes  Menses every 26-28 days.  Length of menses: 5 days    Her current contraception method is: vasectomy.  She  reports that she has never smoked. She has never used smokeless tobacco.    Patient is sexually active.  STD testing offered?  Declined  Last PHQ-9 score on record =   PHQ-9 SCORE 10/25/2019   PHQ-9 Total Score 3     Last GAD7 score on record =   CECILIO-7 SCORE 10/25/2019   Total Score 2     Alcohol Score = 4    HEALTH MAINTENANCE:  Cholesterol: (No results found for: CHOL   Last Mammo: Not applicable, Result: Not applicable, Next Mammo: Due at age 38   Pap: 5/20/15 WNL HPV (-)neg  Colonoscopy:  NA, Result: Not applicable, Next Colonoscopy: NA years.  Dexa:  NA    Health maintenance updated:  yes    HISTORY:  OB History    Para Term  AB Living   3 3 3 0 0 3   SAB TAB Ectopic  "Multiple Live Births   0 0 0 0 3      # Outcome Date GA Lbr Morgan/2nd Weight Sex Delivery Anes PTL Lv   3 Term 09/10/16 39w4d 03:30 / 02:56 3.43 kg (7 lb 9 oz) F  EPI  [place]      Name: Verónica Shrestha      Apgar1: 9  Apgar5: 9   2 Term 08 39w4d 03:35 / 03:20 3.67 kg (8 lb 1.5 oz) M  EPI  [place]      Birth Comments: pushed x 3hrs      Name: Sharla      Apgar1: 9  Apgar5: 9   1 Term 12 40w5d 08:00 / 06:13 4.451 kg (9 lb 13 oz) M CS-LTranv EPI N [place]      Birth Comments: Failure to descend; pushed x 2hrs      Name: Cale      Apgar1: 5  Apgar5: 9       Patient Active Problem List   Diagnosis     Eczema     MEMO (stress urinary incontinence, female)     Cold sore     Past Surgical History:   Procedure Laterality Date      SECTION  2012    Procedure: SECTION; Surgeon:JOESPH STOUT; Location: L+D      Social History     Tobacco Use     Smoking status: Never Smoker     Smokeless tobacco: Never Used   Substance Use Topics     Alcohol use: Yes      Problem (# of Occurrences) Relation (Name,Age of Onset)    Breast Cancer (1) Paternal Grandmother    Heart Disease (1) Paternal Grandfather    Hyperlipidemia (1) Paternal Grandfather    No Known Problems (7) Mother, Father, Maternal Grandmother, Son, Son, Sister, Brother    Osteoporosis (1) Maternal Grandfather    Ovarian Cancer (1) Other (aunt)    Thyroid Disease (1) Paternal Grandmother            Current Outpatient Medications   Medication Sig     Multiple Vitamins-Calcium (ONE-A-DAY WOMENS PO)      valACYclovir (VALTREX) 1000 mg tablet Take 1 tablet (1,000 mg) by mouth every 12 hours NO further refills until seen in clinic     No current facility-administered medications for this visit.      No Known Allergies    Past medical, surgical, social and family histories were reviewed and updated in EPIC.    ROS:   12 point review of systems negative other than symptoms noted below.    EXAM:  /60   Ht 1.651 m (5' 5\")   Wt 60.3 kg (133 lb)   " LMP 10/08/2019 (Approximate)   Breastfeeding? No   BMI 22.13 kg/m     BMI: Body mass index is 22.13 kg/m .    PHYSICAL EXAM:  Constitutional:   Appearance: Well nourished, well developed, alert, in no acute distress  Neck:  Lymph Nodes:  No lymphadenopathy present    Thyroid:  Gland size normal, nontender, no nodules or masses present  on palpation  Chest:  Respiratory Effort:  Breathing unlabored  Cardiovascular:    Heart: Auscultation:  Regular rate, normal rhythm, no murmurs present  Breasts: Inspection of Breasts:  No lymphadenopathy present., Palpation of Breasts and Axillae:  No masses present on palpation, no breast tenderness., Axillary Lymph Nodes:  No lymphadenopathy present. and No nodularity, asymmetry or nipple discharge bilaterally.  Gastrointestinal:   Abdominal Examination:  Abdomen nontender to palpation, tone normal without rigidity or guarding, no masses present, umbilicus without lesions   Liver and Spleen:  No hepatomegaly present, liver nontender to palpation    Hernias:  No hernias present  Lymphatic: Lymph Nodes:  No other lymphadenopathy present  Skin:  General Inspection:  No rashes present, no lesions present, no areas of  discoloration  Neurologic:    Mental Status:  Oriented X3.  Normal strength and tone, sensory exam                grossly normal, mentation intact and speech normal.    Psychiatric:   Mentation appears normal and affect normal/bright.         Pelvic Exam:  External Genitalia:     Normal appearance for age, no discharge present, no tenderness present, no inflammatory lesions present, color normal  Vagina:     Normal vaginal vault without central or paravaginal defects, no discharge present, no inflammatory lesions present, no masses present  Bladder:     Nontender to palpation  Urethra:   Urethral Body:  Urethra palpation normal, urethra structural support normal   Urethral Meatus:  No erythema or lesions present  Cervix:     Appearance healthy, no lesions present,  nontender to palpation, no bleeding present  Uterus:     Uterus: firm, normal sized and nontender, anteverted in position.   Adnexa:     No adnexal tenderness present, no adnexal masses present  Perineum:     Perineum within normal limits, no evidence of trauma, no rashes or skin lesions present  Anus:     Anus within normal limits, no hemorrhoids present  Inguinal Lymph Nodes:     No lymphadenopathy present  Pubic Hair:     Normal pubic hair distribution for age  Genitalia and Groin:     No rashes present, no lesions present, no areas of discoloration, no masses present      COUNSELING:   Reviewed preventive health counseling, as reflected in patient instructions  Special attention given to:        Regular exercise       Healthy diet/nutrition    BMI: Body mass index is 22.13 kg/m .      ASSESSMENT:  34 year old female with satisfactory annual exam.    ICD-10-CM    1. Encounter for gynecological examination without abnormal finding Z01.419    2. HSV (herpes simplex virus) infection B00.9 valACYclovir (VALTREX) 1000 mg tablet   3. Need for prophylactic vaccination and inoculation against influenza Z23 INFLUENZA VACCINE IM > 6 MONTHS VALENT IIV4 [16846]     Vaccine Administration, Initial [37174]   4. Menorrhagia with regular cycle N92.0        PLAN:  Patient Instructions   Follow up with your primary care provider for your other medical problems.  Continue self breast exam.  Increase physical activity and exercise.  Usual safety and preventative measures counseling done.  Flu Shot today.  Last pap smear (2015) was normal and negative for the DNA of high risk HPV subtypes.  No pap was obtained this year.  This was discussed with the patient and she agrees with the plan.  Long discussion today regarding management options for heavy and painful menses.  Discussed revisiting either OCPs or nuvaring, discussed IUDs or discussed non-hormonal option of Lysteda for only her heavy months.  Discussed r/b/a of each including  effectiveness, schedule of usage, side effects, etc.  Will discuss and review the literature and contact me as needed for either prescription or to schedule IUD placement.      Additional 10min spent discussing heavy menses and options for management; discussed options, r/b/a of each, pros and cons of each, etc.  All additional time spent in counseling    Rach James MD

## 2019-10-26 ASSESSMENT — ANXIETY QUESTIONNAIRES: GAD7 TOTAL SCORE: 2

## 2021-02-08 NOTE — PROGRESS NOTES
Mayelin is a 35 year old  female who presents for annual exam.     Besides routine health maintenance, she has no other health concerns today .    HPI:  Here today for yearly exam --doing well.  Continues to have regular but shorter cycles --since her youngest was born, has been having 24d as opposed to 28d cycles.  Heavier bleeding in first few days of cycle --manageable.  No intermenstrual bleeding/spotting.  +SA --no issues.   has vasectomy.  Denies bowel issues.  Is having some ongoing MEMO --fairly stable since the first year after Verónica was born.  Saw PT for initial visit and then didn't follow up.  Knows exercises to avoid and exercises to help her pelvic floor strength.  Also wonders if PT can help with diastasis recti.    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  Sharla in 1st grade and Cale in gifted 3rd grade program;  is in  2d/wk  -staying active; workout regularily and staying active with kids  +SBE --no concerns; occ small, self resolving lumps with cycles  PCP -[facility] --sees as needed  -declines flu shot today      GYNECOLOGIC HISTORY:    Patient's last menstrual period was 2021.    Regular menses? no  Menses every 24 days.  Length of menses: 5 days    Her current contraception method is: none.  She  reports that she has never smoked. She has never used smokeless tobacco.    Patient is sexually active.  STD testing offered?  Declined  Last PHQ-9 score on record =   PHQ-9 SCORE 2021   PHQ-9 Total Score 1     Last GAD7 score on record =   CECILIO-7 SCORE 2021   Total Score 4     Alcohol Score = 3    HEALTH MAINTENANCE:  Cholesterol: (No results found for: CHOL   Last Mammo: Not applicable, Result: Not applicable, Next Mammo: Due at age 38   Pap:[facility] 5/20/15  Colonoscopy:  never, Result: Not applicable, Next Colonoscopy: due sharath ge 48 years.  Dexa:  never    Health  maintenance updated:  yes    HISTORY:  OB History    Para Term  AB Living   3 3 3 0 0 3   SAB TAB Ectopic Multiple Live Births   0 0 0 0 3      # Outcome Date GA Lbr Morgan/2nd Weight Sex Delivery Anes PTL Lv   3 Term 09/10/16 39w4d 03:30 / 02:56 3.43 kg (7 lb 9 oz) F  EPI  [place]      Name: Verónica Sherstha      Apgar1: 9  Apgar5: 9   2 Term 13 39w4d 03:35 / 03:20 3.67 kg (8 lb 1.5 oz) M  EPI  [place]      Birth Comments: pushed x 3hrs      Name: Sharla      Apgar1: 9  Apgar5: 9   1 Term 12 40w5d 08:00 / 06:13 4.451 kg (9 lb 13 oz) M CS-LTranv EPI N [place]      Birth Comments: Failure to descend; pushed x 2hrs      Name: Cale      Apgar1: 5  Apgar5: 9       Patient Active Problem List   Diagnosis     Eczema     MEMO (stress urinary incontinence, female)     Cold sore     Past Surgical History:   Procedure Laterality Date      SECTION  2012    Procedure: SECTION; Surgeon:JOESPH STOUT; Location: L+D      Social History     Tobacco Use     Smoking status: Never Smoker     Smokeless tobacco: Never Used   Substance Use Topics     Alcohol use: Yes     Frequency: 2-3 times a week     Drinks per session: 1 or 2     Binge frequency: Never      Problem (# of Occurrences) Relation (Name,Age of Onset)    Breast Cancer (1) Paternal Grandmother    Heart Disease (1) Paternal Grandfather    Hyperlipidemia (1) Paternal Grandfather    No Known Problems (7) Mother, Father, Maternal Grandmother, Son, Son, Sister, Brother    Osteoporosis (1) Maternal Grandfather    Ovarian Cancer (1) Other (aunt)    Thyroid Disease (1) Paternal Grandmother            Current Outpatient Medications   Medication Sig     Multiple Vitamins-Calcium (ONE-A-DAY WOMENS PO)      valACYclovir (VALTREX) 1000 mg tablet Take 1 tablet (1,000 mg) by mouth every 12 hours NO further refills until seen in clinic     triamcinolone (KENALOG) 0.1 % external cream ARLENE AA ON HANDS BID PRF FLARES     No current facility-administered  "medications for this visit.      No Known Allergies    Past medical, surgical, social and family histories were reviewed and updated in EPIC.    ROS:   12 point review of systems negative other than symptoms noted below or in the HPI.  No urinary frequency or dysuria, bladder or kidney problems    EXAM:  /60   Pulse 64   Ht 1.651 m (5' 5\")   Wt 64 kg (141 lb)   LMP 01/23/2021   BMI 23.46 kg/m     BMI: Body mass index is 23.46 kg/m .    PHYSICAL EXAM:  Constitutional:   Appearance: Well nourished, well developed, alert, in no acute distress  Neck:  Lymph Nodes:  No lymphadenopathy present    Thyroid:  Gland size normal, nontender, no nodules or masses present  on palpation  Chest:  Respiratory Effort:  Breathing unlabored  Cardiovascular:    Heart: Auscultation:  Regular rate, normal rhythm, no murmurs present  Breasts: Inspection of Breasts:  No lymphadenopathy present., Palpation of Breasts and Axillae:  No masses present on palpation, no breast tenderness., Axillary Lymph Nodes:  No lymphadenopathy present. and No nodularity, asymmetry or nipple discharge bilaterally.  Gastrointestinal:   Abdominal Examination:  Abdomen nontender to palpation, tone normal without rigidity or guarding, no masses present, umbilicus without lesions   Liver and Spleen:  No hepatomegaly present, liver nontender to palpation    Hernias:  No hernias present  Lymphatic: Lymph Nodes:  No other lymphadenopathy present  Skin:  General Inspection:  No rashes present, no lesions present, no areas of  discoloration  Neurologic:    Mental Status:  Oriented X3.  Normal strength and tone, sensory exam                grossly normal, mentation intact and speech normal.    Psychiatric:   Mentation appears normal and affect normal/bright.         Pelvic Exam:  External Genitalia:     Normal appearance for age, no discharge present, no tenderness present, no inflammatory lesions present, color normal  Vagina:     Normal vaginal vault without " central or paravaginal defects, no discharge present, no inflammatory lesions present, no masses present  Bladder:     Nontender to palpation  Urethra:   Urethral Body:  Urethra palpation normal, urethra structural support normal   Urethral Meatus:  No erythema or lesions present  Cervix:     Appearance healthy, no lesions present, nontender to palpation, no bleeding present  Uterus:     Uterus: firm, normal sized and nontender, anteverted in position.   Adnexa:     No adnexal tenderness present, no adnexal masses present  Perineum:     Perineum within normal limits, no evidence of trauma, no rashes or skin lesions present  Anus:     Anus within normal limits, no hemorrhoids present  Inguinal Lymph Nodes:     No lymphadenopathy present  Pubic Hair:     Normal pubic hair distribution for age  Genitalia and Groin:     No rashes present, no lesions present, no areas of discoloration, no masses present      COUNSELING:   Reviewed preventive health counseling, as reflected in patient instructions  Special attention given to:        Regular exercise       Healthy diet/nutrition    BMI: Body mass index is 23.46 kg/m .      ASSESSMENT:  35 year old female with satisfactory annual exam.    ICD-10-CM    1. Encounter for gynecological examination without abnormal finding  Z01.419 Pap imaged thin layer screen with HPV - recommended age 30 - 65     HPV High Risk Types DNA Cervical   2. HSV (herpes simplex virus) infection  B00.9 valACYclovir (VALTREX) 1000 mg tablet   3. MEMO (stress urinary incontinence, female)  N39.3 [facility] PT, HAND, AND CHIROPRACTIC REFERRAL   4. Diastasis recti  M62.08 [facility] PT, HAND, AND CHIROPRACTIC REFERRAL       PLAN:  Patient Instructions   Follow up with your primary care provider for your other medical problems.  Continue self breast exam.  Increase physical activity and exercise.  Lab and pap smear results will be called to the patient.  Co-testing done today and will repeat in 5yrs if normal.  Usual  safety and preventative measures counseling done.  Flu Shot declined today.  Will refer back to physical therapy to discuss new or additional exercises for both stress urinary incontinence and diastasis recti.  Once again, discussed mirena IUD for heavy and frequent menses.  Will return for placement at end of or immediately after menses if interested.      Rach James MD

## 2021-02-09 ENCOUNTER — OFFICE VISIT (OUTPATIENT)
Dept: OBGYN | Facility: CLINIC | Age: 36
End: 2021-02-09
Payer: COMMERCIAL

## 2021-02-09 VITALS
WEIGHT: 141 LBS | HEIGHT: 65 IN | SYSTOLIC BLOOD PRESSURE: 102 MMHG | DIASTOLIC BLOOD PRESSURE: 60 MMHG | BODY MASS INDEX: 23.49 KG/M2 | HEART RATE: 64 BPM

## 2021-02-09 DIAGNOSIS — Z01.419 ENCOUNTER FOR GYNECOLOGICAL EXAMINATION WITHOUT ABNORMAL FINDING: Primary | ICD-10-CM

## 2021-02-09 DIAGNOSIS — B00.9 HSV (HERPES SIMPLEX VIRUS) INFECTION: ICD-10-CM

## 2021-02-09 DIAGNOSIS — N39.3 SUI (STRESS URINARY INCONTINENCE, FEMALE): ICD-10-CM

## 2021-02-09 DIAGNOSIS — M62.08 DIASTASIS RECTI: ICD-10-CM

## 2021-02-09 PROCEDURE — 87624 HPV HI-RISK TYP POOLED RSLT: CPT | Performed by: OBSTETRICS & GYNECOLOGY

## 2021-02-09 PROCEDURE — G0145 SCR C/V CYTO,THINLAYER,RESCR: HCPCS | Performed by: OBSTETRICS & GYNECOLOGY

## 2021-02-09 PROCEDURE — 99395 PREV VISIT EST AGE 18-39: CPT | Performed by: OBSTETRICS & GYNECOLOGY

## 2021-02-09 RX ORDER — TRIAMCINOLONE ACETONIDE 1 MG/G
CREAM TOPICAL
COMMUNITY
Start: 2020-05-11

## 2021-02-09 RX ORDER — VALACYCLOVIR HYDROCHLORIDE 1 G/1
1000 TABLET, FILM COATED ORAL EVERY 12 HOURS
Qty: 24 TABLET | Refills: 3 | Status: SHIPPED | OUTPATIENT
Start: 2021-02-09 | End: 2022-02-21

## 2021-02-09 SDOH — HEALTH STABILITY: MENTAL HEALTH: HOW OFTEN DO YOU HAVE 6 OR MORE DRINKS ON ONE OCCASION?: NEVER

## 2021-02-09 SDOH — HEALTH STABILITY: MENTAL HEALTH: HOW OFTEN DO YOU HAVE A DRINK CONTAINING ALCOHOL?: 2-3 TIMES A WEEK

## 2021-02-09 SDOH — HEALTH STABILITY: MENTAL HEALTH: HOW MANY STANDARD DRINKS CONTAINING ALCOHOL DO YOU HAVE ON A TYPICAL DAY?: 1 OR 2

## 2021-02-09 ASSESSMENT — PATIENT HEALTH QUESTIONNAIRE - PHQ9
SUM OF ALL RESPONSES TO PHQ QUESTIONS 1-9: 1
5. POOR APPETITE OR OVEREATING: NOT AT ALL

## 2021-02-09 ASSESSMENT — ANXIETY QUESTIONNAIRES
1. FEELING NERVOUS, ANXIOUS, OR ON EDGE: NOT AT ALL
7. FEELING AFRAID AS IF SOMETHING AWFUL MIGHT HAPPEN: SEVERAL DAYS
GAD7 TOTAL SCORE: 4
6. BECOMING EASILY ANNOYED OR IRRITABLE: SEVERAL DAYS
3. WORRYING TOO MUCH ABOUT DIFFERENT THINGS: SEVERAL DAYS
5. BEING SO RESTLESS THAT IT IS HARD TO SIT STILL: NOT AT ALL
2. NOT BEING ABLE TO STOP OR CONTROL WORRYING: SEVERAL DAYS
IF YOU CHECKED OFF ANY PROBLEMS ON THIS QUESTIONNAIRE, HOW DIFFICULT HAVE THESE PROBLEMS MADE IT FOR YOU TO DO YOUR WORK, TAKE CARE OF THINGS AT HOME, OR GET ALONG WITH OTHER PEOPLE: NOT DIFFICULT AT ALL

## 2021-02-09 ASSESSMENT — MIFFLIN-ST. JEOR: SCORE: 1325.45

## 2021-02-09 NOTE — PATIENT INSTRUCTIONS
Follow up with your primary care provider for your other medical problems.  Continue self breast exam.  Increase physical activity and exercise.  Lab and pap smear results will be called to the patient.  Co-testing done today and will repeat in 5yrs if normal.  Usual safety and preventative measures counseling done.  Flu Shot declined today.  Will refer back to physical therapy to discuss new or additional exercises for both stress urinary incontinence and diastasis recti.  Once again, discussed mirena IUD for heavy and frequent menses.  Will return for placement at end of or immediately after menses if interested.

## 2021-02-10 ASSESSMENT — ANXIETY QUESTIONNAIRES: GAD7 TOTAL SCORE: 4

## 2021-02-12 LAB
COPATH REPORT: NORMAL
PAP: NORMAL

## 2021-02-15 LAB
FINAL DIAGNOSIS: NORMAL
HPV HR 12 DNA CVX QL NAA+PROBE: NEGATIVE
HPV16 DNA SPEC QL NAA+PROBE: NEGATIVE
HPV18 DNA SPEC QL NAA+PROBE: NEGATIVE
SPECIMEN DESCRIPTION: NORMAL
SPECIMEN SOURCE CVX/VAG CYTO: NORMAL

## 2021-11-12 ENCOUNTER — OFFICE VISIT (OUTPATIENT)
Dept: FAMILY MEDICINE | Facility: CLINIC | Age: 36
End: 2021-11-12
Payer: COMMERCIAL

## 2021-11-12 VITALS
TEMPERATURE: 98 F | DIASTOLIC BLOOD PRESSURE: 62 MMHG | SYSTOLIC BLOOD PRESSURE: 90 MMHG | OXYGEN SATURATION: 98 % | HEART RATE: 74 BPM

## 2021-11-12 DIAGNOSIS — R05.9 COUGH: ICD-10-CM

## 2021-11-12 DIAGNOSIS — J18.9 PNEUMONIA OF RIGHT LOWER LOBE DUE TO INFECTIOUS ORGANISM: Primary | ICD-10-CM

## 2021-11-12 PROCEDURE — 99203 OFFICE O/P NEW LOW 30 MIN: CPT | Performed by: PHYSICIAN ASSISTANT

## 2021-11-12 RX ORDER — AZITHROMYCIN 250 MG/1
TABLET, FILM COATED ORAL
Qty: 6 TABLET | Refills: 0 | Status: SHIPPED | OUTPATIENT
Start: 2021-11-12 | End: 2021-11-17

## 2021-11-12 NOTE — PROGRESS NOTES
{PROVIDER CHARTING PREFERENCE:534806}    Gina Dick is a 36 year old who presents for the following health issues {ACCOMPANIED BY STATEMENT (Optional):732798}    HPI       Concern for COVID-19  About how many days ago did these symptoms start? ***  Is this your first visit for this illness? {Yes_No branch:230401}  In the 14 days before your symptoms started, have you had close contact with someone with COVID-19 (Coronavirus)? {COVID19 CLOSE CONTACT:546662}  Do you have a fever or chills? {COVID19 FEVER:227009}  Are you having new or worsening difficulty breathing? {COVID19 DIFFICULTY BREATHIN}  Do you have new or worsening cough? {COVID19 COUGH YES:607647}  Have you had any new or unexplained body aches? {YES_No_***:156459}    Have you experienced any of the following NEW symptoms?    Headache: {YES_No_***:839881}    Sore throat: {YES_No_***:415237}    Loss of taste or smell: {YES_No_***:755599}    Chest pain: {YES_No_***:961444}    Diarrhea: {YES_No_***:754962}    Rash: {YES_No_***:446025}  What treatments have you tried? ***  Who do you live with? ***  Are you, or a household member, a healthcare worker or a ? {YES_No_***:456196}  Do you live in a nursing home, group home, or shelter? {YES_No_***:942328}  Do you have a way to get food/medications if quarantined? {COVID19 Quarantine Assistance:889235}    {Provider  Link to COVID SmartSet :126114}      {additonal problems for provider to add (Optional):391807}    Review of Systems   {ROS COMP (Optional):147763}      Objective    There were no vitals taken for this visit.  There is no height or weight on file to calculate BMI.  Physical Exam   {Exam List (Optional):046729}    {Diagnostic Test Results (Optional):928888}    {AMBULATORY ATTESTATION (Optional):731909}

## 2021-11-12 NOTE — PROGRESS NOTES
Assessment & Plan     Pneumonia of right lower lobe due to infectious organism  Exam consistent with pneumonia. COVID testing negative. No need to repeat testing as she is feeling improved and symptom onset was over 10 days ago.  Treated with Z-pack as noted below. Patient wondering about possible sinus infection. If not improving with one z-pack can do an additional.  Discussed what to use for cough including at home remedies.  - azithromycin (ZITHROMAX) 250 MG tablet; Take 2 tablets (500 mg) by mouth daily for 1 day, THEN 1 tablet (250 mg) daily for 4 days.  - COVID-19 Arnaldo RBD Selena & Titer Reflex; Future    Cough    - azithromycin (ZITHROMAX) 250 MG tablet; Take 2 tablets (500 mg) by mouth daily for 1 day, THEN 1 tablet (250 mg) daily for 4 days.  - COVID-19 Arnaldo RBD Selena & Titer Reflex; Future     Patient Instructions   Humidifier at night or when sleeping.    Steam from shower can help with cough.        Return if symptoms worsen or fail to improve.    [facility]    Gina Dick is a 36 year old who presents for the following health issues  accompanied by her daughter.    HPI     Acute Illness  Acute illness concerns: URI  Onset/Duration: 11days ago  Symptoms:  Fever: Not sure  Chills/Sweats: no  Headache (location?): YES- by eyes and nose   Sinus Pressure: YES  Conjunctivitis:  no  Ear Pain: YES: More pressure then pain on right   Rhinorrhea: YES  Congestion: YES  Sore Throat: YES- Better then it was   Cough: YES-productive of green sputum (some chest tightness)  Wheeze: YES- not wheeze but pressure- tightness  Decreased Appetite: no  Nausea: no  Vomiting: no  Diarrhea: no  Dysuria/Freq.: no  Dysuria or Hematuria: no  Fatigue/Achiness: YES- Fatigue  Sick/Strep Exposure: YES- daughter recently diagnosed with pneumonia (at home rapid test was negative at home- daughter's testing also negative)  Therapies tried and outcome: Mucinex     Review of Systems    GENERAL:  As noted in HPI  RESP:  As noted in HPI        Objective    BP 90/62 (BP Location: Right arm, Patient Position: Sitting, Cuff Size: Adult Regular)   Pulse 74   Temp 98  F (36.7  C) (Oral)   SpO2 98%   There is no height or weight on file to calculate BMI.  Physical Exam   GENERAL: No acute distress  HEENT: Normocephalic, PERRL, Canals patent, bilateral TM's non-erythematous, clear fluid behind bilateral ears. Turbinates normal in appearance bilaterally. Posterior oropharynx non-erythematous and without exudate.  NECK: No cervical or supraclavicular lymphadenopathy.  CARDIAC: Regular rate and rhythm. No murmurs.  PULMONARY: Crackles right lower lobe. No rhonchi or wheezing  NEURO: Alert and non-focal

## 2022-02-19 DIAGNOSIS — B00.9 HSV (HERPES SIMPLEX VIRUS) INFECTION: ICD-10-CM

## 2022-02-21 RX ORDER — VALACYCLOVIR HYDROCHLORIDE 1 G/1
TABLET, FILM COATED ORAL
Qty: 24 TABLET | Refills: 0 | Status: SHIPPED | OUTPATIENT
Start: 2022-02-21 | End: 2022-03-21

## 2022-02-21 NOTE — TELEPHONE ENCOUNTER
"Requested Prescriptions   Pending Prescriptions Disp Refills     valACYclovir (VALTREX) 1000 mg tablet [Pharmacy Med Name: VALACYCLOVIR 1GM TABLETS] 24 tablet 3     Sig: TAKE 1 TABLET BY MOUTH EVERY 12 HOURS       Antivirals for Herpes Protocol Failed - 2/19/2022  8:09 AM        Failed - Recent (12 mo) or future (30 days) visit within the authorizing provider's specialty     Patient has had an office visit with the authorizing provider or a provider within the authorizing providers department within the previous 12 mos or has a future within next 30 days. See \"Patient Info\" tab in inbasket, or \"Choose Columns\" in Meds & Orders section of the refill encounter.              Failed - Normal serum creatinine on file in past 12 months     No lab results found.    Ok to refill medication if creatinine is low          Passed - Patient is age 12 or older        Passed - Medication is active on med list           Prescription approved per [facility] Refill Protocol.  Appointment needed for further refills  Yoana Soliman RN on 2/21/2022 at 9:46 AM    "

## 2022-03-19 DIAGNOSIS — B00.9 HSV (HERPES SIMPLEX VIRUS) INFECTION: ICD-10-CM

## 2022-03-21 RX ORDER — VALACYCLOVIR HYDROCHLORIDE 1 G/1
TABLET, FILM COATED ORAL
Qty: 24 TABLET | Refills: 0 | Status: SHIPPED | OUTPATIENT
Start: 2022-03-21 | End: 2022-07-05

## 2022-03-21 NOTE — TELEPHONE ENCOUNTER
"Requested Prescriptions   Pending Prescriptions Disp Refills     valACYclovir (VALTREX) 1000 mg tablet [Pharmacy Med Name: VALACYCLOVIR 1GM TABLETS] 24 tablet 0     Sig: TAKE 1 TABLET BY MOUTH EVERY 12 HOURS       Antivirals for Herpes Protocol Failed - 3/19/2022 11:33 AM        Failed - Recent (12 mo) or future (30 days) visit within the authorizing provider's specialty     Patient has had an office visit with the authorizing provider or a provider within the authorizing providers department within the previous 12 mos or has a future within next 30 days. See \"Patient Info\" tab in inbasket, or \"Choose Columns\" in Meds & Orders section of the refill encounter.              Failed - Normal serum creatinine on file in past 12 months     No lab results found.    Ok to refill medication if creatinine is low          Passed - Patient is age 12 or older        Passed - Medication is active on med list           Refilled x 1  Appointment needed for further refills  Yoana Soliman RN on 3/21/2022 at 9:48 AM    "

## 2022-07-05 DIAGNOSIS — B00.9 HSV (HERPES SIMPLEX VIRUS) INFECTION: ICD-10-CM

## 2022-07-05 RX ORDER — VALACYCLOVIR HYDROCHLORIDE 1 G/1
TABLET, FILM COATED ORAL
Qty: 24 TABLET | Refills: 1 | Status: SHIPPED | OUTPATIENT
Start: 2022-07-05 | End: 2022-09-15

## 2022-07-05 NOTE — TELEPHONE ENCOUNTER
"Requested Prescriptions   Pending Prescriptions Disp Refills     valACYclovir (VALTREX) 1000 mg tablet [Pharmacy Med Name: VALACYCLOVIR 1GM TABLETS] 24 tablet 0     Sig: TAKE 1 TABLET BY MOUTH EVERY 12 HOURS       Antivirals for Herpes Protocol Failed - 7/5/2022  9:33 AM        Failed - Recent (12 mo) or future (30 days) visit within the authorizing provider's specialty     Patient has had an office visit with the authorizing provider or a provider within the authorizing providers department within the previous 12 mos or has a future within next 30 days. See \"Patient Info\" tab in inbasket, or \"Choose Columns\" in Meds & Orders section of the refill encounter.              Failed - Normal serum creatinine on file in past 12 months     No lab results found.    Ok to refill medication if creatinine is low          Passed - Patient is age 12 or older        Passed - Medication is active on med list             Last OV: 2/9/21    Future Appointments 7/5/2022 - 1/1/2023      Date Visit Type Length Department Provider     9/15/2022 11:40 AM PHYSICAL 20 min [facility] Rach James MD    Location Instructions:     The clinic is located at [address] [place] [address], [address] [id number]                   Medication is being filled for 1 time refill only due to:  Patient needs to be seen because it has been more than one year since last visit. appt scheduled for 9/15/22 with Dr.Collins Chantal Marcelo, [place]      "

## 2022-09-15 ENCOUNTER — OFFICE VISIT (OUTPATIENT)
Dept: OBGYN | Facility: CLINIC | Age: 37
End: 2022-09-15
Payer: COMMERCIAL

## 2022-09-15 VITALS
SYSTOLIC BLOOD PRESSURE: 118 MMHG | DIASTOLIC BLOOD PRESSURE: 70 MMHG | BODY MASS INDEX: 19.66 KG/M2 | WEIGHT: 118 LBS | HEIGHT: 65 IN

## 2022-09-15 DIAGNOSIS — B00.9 HSV (HERPES SIMPLEX VIRUS) INFECTION: ICD-10-CM

## 2022-09-15 DIAGNOSIS — Z01.419 ENCOUNTER FOR GYNECOLOGICAL EXAMINATION WITHOUT ABNORMAL FINDING: Primary | ICD-10-CM

## 2022-09-15 PROCEDURE — 99395 PREV VISIT EST AGE 18-39: CPT | Performed by: OBSTETRICS & GYNECOLOGY

## 2022-09-15 RX ORDER — VALACYCLOVIR HYDROCHLORIDE 1 G/1
TABLET, FILM COATED ORAL
Qty: 24 TABLET | Refills: 3 | Status: SHIPPED | OUTPATIENT
Start: 2022-09-15 | End: 2023-01-06

## 2022-09-15 ASSESSMENT — ANXIETY QUESTIONNAIRES
IF YOU CHECKED OFF ANY PROBLEMS ON THIS QUESTIONNAIRE, HOW DIFFICULT HAVE THESE PROBLEMS MADE IT FOR YOU TO DO YOUR WORK, TAKE CARE OF THINGS AT HOME, OR GET ALONG WITH OTHER PEOPLE: SOMEWHAT DIFFICULT
GAD7 TOTAL SCORE: 2
5. BEING SO RESTLESS THAT IT IS HARD TO SIT STILL: NOT AT ALL
3. WORRYING TOO MUCH ABOUT DIFFERENT THINGS: SEVERAL DAYS
GAD7 TOTAL SCORE: 2
6. BECOMING EASILY ANNOYED OR IRRITABLE: NOT AT ALL
7. FEELING AFRAID AS IF SOMETHING AWFUL MIGHT HAPPEN: NOT AT ALL
2. NOT BEING ABLE TO STOP OR CONTROL WORRYING: NOT AT ALL
1. FEELING NERVOUS, ANXIOUS, OR ON EDGE: SEVERAL DAYS

## 2022-09-15 ASSESSMENT — PATIENT HEALTH QUESTIONNAIRE - PHQ9
5. POOR APPETITE OR OVEREATING: NOT AT ALL
SUM OF ALL RESPONSES TO PHQ QUESTIONS 1-9: 3

## 2022-09-15 NOTE — PROGRESS NOTES
Mayelin is a 37 year old  female who presents for annual exam.     Besides routine health maintenance,  she would like to discuss birth control options.    HPI:  Here today for yearly exam --doing well.  Regular, monthly menses x 5-6d.  Heaviness of bleeding has seemed to improve this year.  Every few periods a bit heavier but seems more manageable.  No breakthrough bleeding/spotting.  +SA --no issues.  Denies pain or discomfort.  No bowel issues.  Has continued to have MEMO issues --definitely does better with pilates, pelvic floor exercises, etc but still always present.  Has noticed slight worsening of symptoms around her period.    Currently  and moving toward divorce; Connor moved out in Feb; kids are doing okay --Jen 5yo, Cale 9yo and Hobie 8yo; spending ~75/25 with Mayelin vs Connor; homemaker  -staying active with pilates, walking, etc  +SBE --no issues; no personal or family hx breast dz  PCP -[facility]; no other medical issues  -still considering Mirena; has been hesitant to use hormones but will need contraception with any new relationships as Connor had vasectomy      GYNECOLOGIC HISTORY:    Patient's last menstrual period was 2022.    Regular menses? yes  Menses every 26 days.  Length of menses: 5 days    Her current contraception method is: vasectomy.  She  reports that she has never smoked. She has never used smokeless tobacco.    Patient is sexually active.  STD testing offered?  Declined  Last PHQ-9 score on record =   PHQ-9 SCORE 9/15/2022   PHQ-9 Total Score 3     Last GAD7 score on record =   CECILIO-7 SCORE 9/15/2022   Total Score 2     Alcohol Score = 0    HEALTH MAINTENANCE:  Cholesterol: (No results found for: CHOL   Last Mammo: Not applicable, Result: Not applicable, Next Mammo: Due at age 38   Pap:   Lab Results   Component Value Date    PAP NIL 2021 WNL HPV (-)neg  Colonoscopy:  NA, Result: Not applicable, Next Colonoscopy: NA years.  Dexa:   NA    Health maintenance updated:  yes    HISTORY:  OB History    Para Term  AB Living   3 3 3 0 0 3   SAB IAB Ectopic Multiple Live Births   0 0 0 0 3      # Outcome Date GA Lbr Morgan/2nd Weight Sex Delivery Anes PTL Lv   3 Term 09/10/16 39w4d 03:30 / 02:56 3.43 kg (7 lb 9 oz) F  EPI  [place]      Name: Verónica Shrestha      Apgar1: 9  Apgar5: 9   2 Term 08 39w4d 03:35 / 03:20 3.67 kg (8 lb 1.5 oz) M  EPI  [place]      Birth Comments: pushed x 3hrs      Name: Sharla      Apgar1: 9  Apgar5: 9   1 Term 12 40w5d 08:00 / 06:13 4.451 kg (9 lb 13 oz) M CS-LTranv EPI N [place]      Birth Comments: Failure to descend; pushed x 2hrs      Name: Cale      Apgar1: 5  Apgar5: 9       Patient Active Problem List   Diagnosis     Eczema     MEMO (stress urinary incontinence, female)     Cold sore     Past Surgical History:   Procedure Laterality Date      SECTION  2012    Procedure: SECTION; Surgeon:JOESPH STOUT; Location: L+D      Social History     Tobacco Use     Smoking status: Never Smoker     Smokeless tobacco: Never Used   Substance Use Topics     Alcohol use: Yes      Problem (# of Occurrences) Relation (Name,Age of Onset)    Breast Cancer (1) Paternal Grandmother    Heart Disease (1) Paternal Grandfather    Hyperlipidemia (1) Paternal Grandfather    No Known Problems (7) Mother, Father, Maternal Grandmother, Son, Son, Sister, Brother    Osteoporosis (1) Maternal Grandfather    Ovarian Cancer (1) Other (aunt)    Thyroid Disease (1) Paternal Grandmother            Current Outpatient Medications   Medication Sig     Multiple Vitamins-Calcium (ONE-A-DAY WOMENS PO)      triamcinolone (KENALOG) 0.1 % external cream ARLENE AA ON HANDS BID PRF FLARES     valACYclovir (VALTREX) 1000 mg tablet TAKE 1 TABLET BY MOUTH EVERY 12 HOURS     No current facility-administered medications for this visit.     No Known Allergies    Past medical, surgical, social and family histories were reviewed and  "updated in EPIC.    ROS:   12 point review of systems negative other than symptoms noted below or in the HPI.  No urinary frequency or dysuria, bladder or kidney problems, Normal menstrual cycles    EXAM:  /70   Ht 1.651 m (5' 5\")   Wt 53.5 kg (118 lb)   LMP 08/25/2022   Breastfeeding No   BMI 19.64 kg/m     BMI: Body mass index is 19.64 kg/m .    PHYSICAL EXAM:  Constitutional:   Appearance: Well nourished, well developed, alert, in no acute distress  Neck:  Lymph Nodes:  No lymphadenopathy present    Thyroid:  Gland size normal, nontender, no nodules or masses present  on palpation  Chest:  Respiratory Effort:  Breathing unlabored  Cardiovascular:    Heart: Auscultation:  Regular rate, normal rhythm, no murmurs present  Breasts: Palpation of Breasts and Axillae:  No masses present on palpation, no breast tenderness., Axillary Lymph Nodes:  No lymphadenopathy present. and No nodularity, asymmetry or nipple discharge bilaterally.  Gastrointestinal:   Abdominal Examination:  Abdomen nontender to palpation, tone normal without rigidity or guarding, no masses present, umbilicus without lesions   Liver and Spleen:  No hepatomegaly present, liver nontender to palpation    Hernias:  No hernias present  Lymphatic: Lymph Nodes:  No other lymphadenopathy present  Skin:  General Inspection:  No rashes present, no lesions present, no areas of  discoloration  Neurologic:    Mental Status:  Oriented X3.  Normal strength and tone, sensory exam                grossly normal, mentation intact and speech normal.    Psychiatric:   Mentation appears normal and affect normal/bright.         Pelvic Exam:  External Genitalia:     Normal appearance for age, no discharge present, no tenderness present, no inflammatory lesions present, color normal  Vagina:     Normal vaginal vault without central or paravaginal defects, no discharge present, no inflammatory lesions present, no masses present  Bladder:     Nontender to " palpation  Urethra:   Urethral Body:  Urethra palpation normal, urethra structural support normal   Urethral Meatus:  No erythema or lesions present  Cervix:     Appearance healthy, no lesions present, nontender to palpation, no bleeding present  Uterus:     Uterus: firm, normal sized and nontender, anteverted in position.   Adnexa:     No adnexal tenderness present, no adnexal masses present  Perineum:     Perineum within normal limits, no evidence of trauma, no rashes or skin lesions present  Anus:     Anus within normal limits, no hemorrhoids present  Inguinal Lymph Nodes:     No lymphadenopathy present  Pubic Hair:     Normal pubic hair distribution for age  Genitalia and Groin:     No rashes present, no lesions present, no areas of discoloration, no masses present      COUNSELING:   Reviewed preventive health counseling, as reflected in patient instructions  Special attention given to:        Regular exercise       Healthy diet/nutrition       Contraception       Safe sex practices/STD prevention    BMI: Body mass index is 19.64 kg/m .      ASSESSMENT:  37 year old female with satisfactory annual exam.    ICD-10-CM    1. Encounter for gynecological examination without abnormal finding  Z01.419    2. HSV (herpes simplex virus) infection  B00.9 valACYclovir (VALTREX) 1000 mg tablet       PLAN:  Patient Instructions   Follow up with your primary care provider for your other medical problems.  Continue self breast exam.  Increase physical activity and exercise.  Usual safety and preventative measures counseling done.  Last pap smear (2021) was normal and negative for the DNA of high risk HPV subtypes.  No pap was obtained this year.  This was discussed with the patient and she agrees with the plan.   Will likely return for mirena IUD placement with future period.  Would provide contraception and hopefully help regulate periods as well as potentially MEMO with more signficant symptoms noted around menses.  Will plan  for first mammogram as well as fasting labs with annual exam next year.      Rach James MD

## 2022-09-15 NOTE — PATIENT INSTRUCTIONS
Follow up with your primary care provider for your other medical problems.  Continue self breast exam.  Increase physical activity and exercise.  Usual safety and preventative measures counseling done.  Last pap smear (2021) was normal and negative for the DNA of high risk HPV subtypes.  No pap was obtained this year.  This was discussed with the patient and she agrees with the plan.   Will likely return for mirena IUD placement with future period.  Would provide contraception and hopefully help regulate periods as well as potentially MEMO with more signficant symptoms noted around menses.  Will plan for first mammogram as well as fasting labs with annual exam next year.

## 2023-01-05 ENCOUNTER — OFFICE VISIT (OUTPATIENT)
Dept: FAMILY MEDICINE | Facility: CLINIC | Age: 38
End: 2023-01-05
Payer: COMMERCIAL

## 2023-01-05 VITALS
WEIGHT: 124.5 LBS | BODY MASS INDEX: 20.74 KG/M2 | DIASTOLIC BLOOD PRESSURE: 68 MMHG | RESPIRATION RATE: 16 BRPM | HEART RATE: 76 BPM | HEIGHT: 65 IN | SYSTOLIC BLOOD PRESSURE: 112 MMHG | OXYGEN SATURATION: 98 % | TEMPERATURE: 97.8 F

## 2023-01-05 DIAGNOSIS — R52 BODY ACHES: ICD-10-CM

## 2023-01-05 DIAGNOSIS — R50.9 FEVER, UNSPECIFIED FEVER CAUSE: ICD-10-CM

## 2023-01-05 DIAGNOSIS — R07.0 THROAT PAIN: Primary | ICD-10-CM

## 2023-01-05 LAB
ALBUMIN UR-MCNC: NEGATIVE MG/DL
APPEARANCE UR: CLEAR
BILIRUB UR QL STRIP: NEGATIVE
COLOR UR AUTO: YELLOW
DEPRECATED S PYO AG THROAT QL EIA: NEGATIVE
GLUCOSE UR STRIP-MCNC: NEGATIVE MG/DL
HGB UR QL STRIP: NEGATIVE
KETONES UR STRIP-MCNC: NEGATIVE MG/DL
LEUKOCYTE ESTERASE UR QL STRIP: NEGATIVE
NITRATE UR QL: NEGATIVE
PH UR STRIP: 6 [PH] (ref 5–7)
SP GR UR STRIP: 1.01 (ref 1–1.03)
UROBILINOGEN UR STRIP-ACNC: 0.2 E.U./DL

## 2023-01-05 PROCEDURE — U0003 INFECTIOUS AGENT DETECTION BY NUCLEIC ACID (DNA OR RNA); SEVERE ACUTE RESPIRATORY SYNDROME CORONAVIRUS 2 (SARS-COV-2) (CORONAVIRUS DISEASE [COVID-19]), AMPLIFIED PROBE TECHNIQUE, MAKING USE OF HIGH THROUGHPUT TECHNOLOGIES AS DESCRIBED BY CMS-2020-01-R: HCPCS | Performed by: NURSE PRACTITIONER

## 2023-01-05 PROCEDURE — 87651 STREP A DNA AMP PROBE: CPT | Performed by: NURSE PRACTITIONER

## 2023-01-05 PROCEDURE — 99213 OFFICE O/P EST LOW 20 MIN: CPT | Performed by: NURSE PRACTITIONER

## 2023-01-05 PROCEDURE — U0005 INFEC AGEN DETEC AMPLI PROBE: HCPCS | Performed by: NURSE PRACTITIONER

## 2023-01-05 PROCEDURE — 81003 URINALYSIS AUTO W/O SCOPE: CPT | Performed by: NURSE PRACTITIONER

## 2023-01-05 RX ORDER — SPIRONOLACTONE 50 MG/1
1 TABLET, FILM COATED ORAL
COMMUNITY
Start: 2022-12-03 | End: 2023-03-21

## 2023-01-05 ASSESSMENT — PATIENT HEALTH QUESTIONNAIRE - PHQ9
SUM OF ALL RESPONSES TO PHQ QUESTIONS 1-9: 4
10. IF YOU CHECKED OFF ANY PROBLEMS, HOW DIFFICULT HAVE THESE PROBLEMS MADE IT FOR YOU TO DO YOUR WORK, TAKE CARE OF THINGS AT HOME, OR GET ALONG WITH OTHER PEOPLE: NOT DIFFICULT AT ALL
SUM OF ALL RESPONSES TO PHQ QUESTIONS 1-9: 4

## 2023-01-05 ASSESSMENT — ANXIETY QUESTIONNAIRES
GAD7 TOTAL SCORE: 3
7. FEELING AFRAID AS IF SOMETHING AWFUL MIGHT HAPPEN: NOT AT ALL
IF YOU CHECKED OFF ANY PROBLEMS ON THIS QUESTIONNAIRE, HOW DIFFICULT HAVE THESE PROBLEMS MADE IT FOR YOU TO DO YOUR WORK, TAKE CARE OF THINGS AT HOME, OR GET ALONG WITH OTHER PEOPLE: SOMEWHAT DIFFICULT
3. WORRYING TOO MUCH ABOUT DIFFERENT THINGS: SEVERAL DAYS
GAD7 TOTAL SCORE: 3
2. NOT BEING ABLE TO STOP OR CONTROL WORRYING: NOT AT ALL
4. TROUBLE RELAXING: NOT AT ALL
8. IF YOU CHECKED OFF ANY PROBLEMS, HOW DIFFICULT HAVE THESE MADE IT FOR YOU TO DO YOUR WORK, TAKE CARE OF THINGS AT HOME, OR GET ALONG WITH OTHER PEOPLE?: SOMEWHAT DIFFICULT
1. FEELING NERVOUS, ANXIOUS, OR ON EDGE: SEVERAL DAYS
GAD7 TOTAL SCORE: 3
6. BECOMING EASILY ANNOYED OR IRRITABLE: SEVERAL DAYS
7. FEELING AFRAID AS IF SOMETHING AWFUL MIGHT HAPPEN: NOT AT ALL
5. BEING SO RESTLESS THAT IT IS HARD TO SIT STILL: NOT AT ALL

## 2023-01-05 NOTE — PATIENT INSTRUCTIONS
Results for orders placed or performed in visit on 01/05/23   Streptococcus A Rapid Screen w/Reflex to PCR - Clinic Collect     Status: Normal    Specimen: Throat; Swab   Result Value Ref Range    Group A Strep antigen Negative Negative

## 2023-01-05 NOTE — PROGRESS NOTES
Assessment & Plan     Mayelin was seen today for pharyngitis and fever.    Diagnoses and all orders for this visit:    Results for orders placed or performed in visit on 01/05/23   Symptomatic COVID-19 Virus (Coronavirus) by PCR Nose     Status: Normal    Specimen: Nose; Swab   Result Value Ref Range    SARS CoV2 PCR Negative Negative    Narrative    Testing was performed using the Aptima SARS-CoV-2 Assay on the  [employer] Instrument System. Additional information about this  Emergency Use Authorization (EUA) assay can be found via the Lab  Guide. This test should be ordered for the detection of SARS-CoV-2 in  individuals who meet SARS-CoV-2 clinical and/or epidemiological  criteria. Test performance is unknown in asymptomatic patients. This  test is for in vitro diagnostic use under the FDA EUA for  laboratories certified under CLIA to perform high complexity testing.  This test has not been FDA cleared or approved. A negative result  does not rule out the presence of PCR inhibitors in the specimen or  target RNA in concentration below the limit of detection for the  assay. The possibility of a false negative should be considered if  the patient's recent exposure or clinical presentation suggests  COVID-19. This test was validated by the [facility]. This laboratory is certified under  the Clinical Laboratory Improvement Amendments of 1988 (CLIA-88) as  qualified to perform high complexity laboratory testing.   UA Macro with Reflex to Micro and Culture - lab collect     Status: Normal    Specimen: Urine, Midstream   Result Value Ref Range    Color Urine Yellow Colorless, Straw, Light Yellow, Yellow    Appearance Urine Clear Clear    Glucose Urine Negative Negative mg/dL    Bilirubin Urine Negative Negative    Ketones Urine Negative Negative mg/dL    Specific Gravity Urine 1.015 1.003 - 1.035    Blood Urine Negative Negative    pH Urine 6.0 5.0 - 7.0    Protein Albumin  Urine Negative Negative mg/dL    Urobilinogen Urine 0.2 0.2, 1.0 E.U./dL    Nitrite Urine Negative Negative    Leukocyte Esterase Urine Negative Negative    Narrative    Microscopic not indicated   Streptococcus A Rapid Screen w/Reflex to PCR - Clinic Collect     Status: Normal    Specimen: Throat; Swab   Result Value Ref Range    Group A Strep antigen Negative Negative   Group A Streptococcus PCR Throat Swab     Status: Normal    Specimen: Throat; Swab   Result Value Ref Range    Group A strep by PCR Not Detected Not Detected    Narrative    The Xpert Xpress Strep A test, performed on the [employer] Systems, is a rapid, qualitative in vitro diagnostic test for the detection of Streptococcus pyogenes (Group A ß-hemolytic Streptococcus, Strep A) in throat swab specimens from patients with signs and symptoms of pharyngitis. The Xpert Xpress Strep A test can be used as an aid in the diagnosis of Group A Streptococcal pharyngitis. The assay is not intended to monitor treatment for Group A Streptococcus infections. The Xpert Xpress Strep A test utilizes an automated real-time polymerase chain reaction (PCR) to detect Streptococcus pyogenes DNA.       Throat pain  Fever, unspecified fever cause  Body aches  Patient education completed regarding viral cause, typical course, symptomatic treatment and when to follow-up.    -     Symptomatic COVID-19 Virus (Coronavirus) by PCR Nose  -     UA Macro with Reflex to Micro and Culture - lab collect  -     Streptococcus A Rapid Screen w/Reflex to PCR - Clinic Collect  -     Group A Streptococcus PCR Throat Swab        Return in about 1 week (around 1/12/2023) for No improvement or sooner with worsening symptoms.    HARINDER Jones [facility]    Gina Dick is a 37 year old, presenting for the following health issues:  Pharyngitis and Fever    Acute Illness      Acute illness concerns: scratchy throat, little sinus congestion ,  "then aches x 2 days. Body aches no other symptoms.lightheaded and brain fog. Wants to rule out strep.   Son had influenza a , then tested positive for strep . Personal history of influenza 6 weeks ago .   Onset/Duration: x 2 days ago ( body aches)   Symptoms:  Fever: YES- 101 ear   Chills/Sweats: YES  Headache (location?): YES  Sinus Pressure: YES- gone now   Conjunctivitis:  No  Ear Pain: no  Rhinorrhea: No  Congestion: No  Sore Throat: more dry and scratchy   Cough: no  Wheeze: No  Decreased Appetite: No  Nausea: YES  Vomiting: No  Diarrhea: No  Dysuria/Freq.: No  Dysuria or Hematuria: No  Fatigue/Achiness: YES- started 2 days ago   Sick/Strep Exposure: YES  Therapies tried and outcome: ibuprofen     Son sick with strep after Influenza - questioning rheumatic fever.       Review of Systems   Constitutional, HEENT, cardiovascular, pulmonary, gi and gu systems are negative, except as otherwise noted.      Objective    /68   Pulse 76   Temp 97.8  F (36.6  C) (Tympanic)   Resp 16   Ht 1.651 m (5' 5\")   Wt 56.5 kg (124 lb 8 oz)   LMP 12/29/2022   SpO2 98%   BMI 20.72 kg/m    Body mass index is 20.72 kg/m .     Physical Exam     GENERAL: healthy, alert and no distress  EYES: Eyes grossly normal to inspection  HENT: ear canals and TM's normal, nose and mouth without ulcers or lesions  RESP: lungs clear to auscultation - no rales, rhonchi or wheezes  CV: regular rate and rhythm, normal S1 S2  PSYCH: mentation appears normal, affect normal/bright              "

## 2023-01-06 ENCOUNTER — TELEPHONE (OUTPATIENT)
Dept: FAMILY MEDICINE | Facility: CLINIC | Age: 38
End: 2023-01-06

## 2023-01-06 DIAGNOSIS — B00.9 HSV (HERPES SIMPLEX VIRUS) INFECTION: ICD-10-CM

## 2023-01-06 LAB
GROUP A STREP BY PCR: NOT DETECTED
SARS-COV-2 RNA RESP QL NAA+PROBE: NEGATIVE

## 2023-01-06 RX ORDER — VALACYCLOVIR HYDROCHLORIDE 1 G/1
1000 TABLET, FILM COATED ORAL 2 TIMES DAILY
Qty: 24 TABLET | Refills: 3 | Status: SHIPPED | OUTPATIENT
Start: 2023-01-06 | End: 2023-10-19

## 2023-01-06 NOTE — RESULT ENCOUNTER NOTE
Dear Mayelin,     Strep PCR was negative and reassuring.      Please send a [email or web address] message or call [phone]  if you have any questions.      Rupinder Varma, APRN, CNP  [facility]

## 2023-01-06 NOTE — RESULT ENCOUNTER NOTE
Dear Mayelin,     Urine testing was normal and not suggestive of infection.     Please send a [employer] message or call [phone]  if you have any questions.      HARINDER Jones, CNP  [facility]    If you have further questions about the interpretation of your labs, [email or web address] is a good website to check out for further information.

## 2023-01-06 NOTE — TELEPHONE ENCOUNTER
Patient called and stated she was suppose to get a refill on her Valtrex yesterday     Appointment 1/5/23  The patient states she does not have any refills on file.      Disp Refills Start End ROBERT   valACYclovir (VALTREX) 1000 mg tablet 24 tablet 3 9/15/2022  No   Sig: TAKE 1 TABLET BY MOUTH EVERY 12 HOURS   Sent to pharmacy as: valACYclovir HCl 1 GM Oral Tablet (VALTREX)   Class: E-Prescribe   Order: [id number]   E-Prescribing Status: Receipt confirmed by pharmacy (9/15/2022  1:19 PM CDT)       [id number] [address] AT [facility] [address] [facility] [address] [facility]       Tori OREILLY RN   [facility] Triage

## 2023-01-09 NOTE — RESULT ENCOUNTER NOTE
Dear Mayelin,     Reassuring negative COVID-19 test.      Please send a [employer] message or call [phone]  if you have any questions.      Rupinder Varma, HARINDER, CNP  [facility]    If you have further questions about the interpretation of your labs, [email or web address] is a good website to check out for further information.

## 2023-01-14 ENCOUNTER — HEALTH MAINTENANCE LETTER (OUTPATIENT)
Age: 38
End: 2023-01-14

## 2023-02-15 DIAGNOSIS — Z01.812 PRE-PROCEDURE LAB EXAM: Primary | ICD-10-CM

## 2023-02-17 ENCOUNTER — TELEPHONE (OUTPATIENT)
Dept: OBGYN | Facility: CLINIC | Age: 38
End: 2023-02-17
Payer: COMMERCIAL

## 2023-02-17 NOTE — TELEPHONE ENCOUNTER
Took plan b 10 days ago  Got her period a couple of days ago-is 3-4 days earlier than normal  Mild cramping-typical to her normal period cramps    Concern is heavy bleeding, usually does have heavy periods but this is more than usual.     Has saturated a large pad in an hour. Sometimes bleeding is less and then very heavy again.     Discussed if this continues and/or Mayelin has any dizziness, racing heart beat, etc will need to be seen in the ER today.    Pt agrees with plan, would like to monitor for the next couple of hours at home but will go to the ER if bleeding continues.  Yoana Soliman RN on 2/17/2023 at 9:39 AM

## 2023-03-17 NOTE — PROGRESS NOTES
IUD Insertion:  CONSULT:    Is a pregnancy test required: Yes.  Was it positive or negative?  Negative  Was a consent obtained?  Yes    Subjective: Mayelin Alexander is a 37 year old  presents for IUD and desires Mirena type IUD.    Patient has been given the opportunity to ask questions about all forms of birth control, including all options appropriate for Mayelin Alexander. Discussed that no method of birth control, except abstinence is 100% effective against pregnancy or sexually transmitted infection.     Mayelin Alexander understands she may have the IUD removed at any time. IUD should be removed by a health care provider.    The entire insertion procedure was reviewed with the patient, including care after placement.    Patient's last menstrual period was 2023 (exact date). Has current contraception. No allergy to betadine or shellfish. Patient declines STD screening  hCG Urine Qualitative   Date Value Ref Range Status   2023 Negative Negative Final     Comment:     This test is for screening purposes.  Results should be interpreted along with the clinical picture.  Confirmation testing is available if warranted by ordering [id number], HCG Quantitative Pregnancy.         /68   Wt 57.2 kg (126 lb)   LMP 2023 (Exact Date)   BMI 20.97 kg/m      Pelvic Exam:   EG/BUS: normal genital architecture without lesions, erythema or abnormal secretions.   Vagina: moist, pink, rugae with physiologic discharge and secretions  Cervix: multiparous no lesions and pink, moist, closed, without lesion or CMT  Uterus: anteverted position, mobile, no pain  Adnexa: within normal limits and no masses, nodularity, tenderness    PROCEDURE NOTE: -- IUD Insertion    Reason for Insertion: contraception    Under sterile technique, cervix was visualized with speculum and prepped with Betadine solution swab x 3. Tenaculum was placed for stability. The uterus was gently straightened and  sounded to 6.5 cm. IUD prepared for placement, and IUD inserted according to 's instructions without difficulty or significant resitance, and deployed at the fundus. The strings were visualized and trimmed to 3.0 cm from the external os. Tenaculum was removed and hemostasis noted. Speculum removed.  Patient tolerated procedure well.    LOT [id number]  EXP 10/2024  NDC 16543-625-19    EBL: minimal    Complications: none    ASSESSMENT:     ICD-10-CM    1. Encounter for insertion of intrauterine contraceptive device  Z30.430 levonorgestrel (MIRENA) 20 MCG/DAY IUD     levonorgestrel (MIRENA) 20 MCG/DAY IUD 20 mcg     INSERTION INTRAUTERINE DEVICE           PLAN:    Given 's handouts, including when to have IUD removed, list of danger s/sx, side effects and follow up recommended. Encouraged condom use for prevention of STD. Back up contraception advised for 7 days if progestin method. Advised to call for any fever, for prolonged or severe pain or bleeding, abnormal vaginal discharge, or unable to palpate strings. She was advised to use pain medications (ibuprofen) as needed for mild to moderate pain. Advised to follow-up in clinic in 4-6 weeks for IUD string check if unable to find strings or as directed by provider.     Rach James MD

## 2023-03-21 ENCOUNTER — OFFICE VISIT (OUTPATIENT)
Dept: OBGYN | Facility: CLINIC | Age: 38
End: 2023-03-21
Payer: COMMERCIAL

## 2023-03-21 ENCOUNTER — LAB (OUTPATIENT)
Dept: LAB | Facility: CLINIC | Age: 38
End: 2023-03-21
Payer: COMMERCIAL

## 2023-03-21 VITALS — WEIGHT: 126 LBS | SYSTOLIC BLOOD PRESSURE: 120 MMHG | DIASTOLIC BLOOD PRESSURE: 68 MMHG | BODY MASS INDEX: 20.97 KG/M2

## 2023-03-21 DIAGNOSIS — Z01.812 PRE-PROCEDURE LAB EXAM: ICD-10-CM

## 2023-03-21 DIAGNOSIS — Z30.430 ENCOUNTER FOR INSERTION OF INTRAUTERINE CONTRACEPTIVE DEVICE: Primary | ICD-10-CM

## 2023-03-21 LAB — HCG UR QL: NEGATIVE

## 2023-03-21 PROCEDURE — 81025 URINE PREGNANCY TEST: CPT

## 2023-03-21 PROCEDURE — 58300 INSERT INTRAUTERINE DEVICE: CPT | Performed by: OBSTETRICS & GYNECOLOGY

## 2023-04-28 NOTE — PROGRESS NOTES
SUBJECTIVE:                                                   Mayelin Alexander is a 37 year old female who presents to clinic today for the following health issue(s):  Patient presents with:  IUD: Mirena inserted 3/21, has been having bleeding/spotting daily since insertion. Some cramping. Notices after intercourse its more fresh red blood than older     HPI:  Here today for IUD check.  Had mirena IUD placed with me 3/21 without issues.  Had a few days of red bleeding and then brownish/old blood.  Had regular period ~10d after IUD was placed.  Still having spotting --most days just needs liner but occ needs tampon.  Had cramping for the first week or two.  Occ bloating sensation.  Has been SA with new IUD.  Had one instance of discomfort.  Had some bright red bleeding with sex --short lived but has happened most times.  Otherwise feeling good  22 days of school left; working on arranging summer activities    Patient's last menstrual period was 2023 (exact date)..     Patient is sexually active, .  Using IUD for contraception.    reports that she has never smoked. She has never used smokeless tobacco.    STD testing offered?  Declined    Health maintenance updated:  yes    Today's PHQ-2 Score:       2021    11:06 AM   PHQ-2 (  [employer])   Q1: Little interest or pleasure in doing things 0   Q2: Feeling down, depressed or hopeless 0   PHQ-2 Score 0   PHQ-2 Total Score (12-17 Years)- Positive if 3 or more points; Administer PHQ-A if positive 0     Problem list and histories reviewed & adjusted, as indicated.  Additional history: as documented.    Patient Active Problem List   Diagnosis     Eczema     MEMO (stress urinary incontinence, female)     Cold sore     Past Surgical History:   Procedure Laterality Date      SECTION  2012    Procedure: SECTION; Surgeon:JOESPH STOUT; Location: L+D      Social History     Tobacco Use     Smoking status: Never     Smokeless  tobacco: Never   Vaping Use     Vaping status: Never Used   Substance Use Topics     Alcohol use: Yes      Problem (# of Occurrences) Relation (Name,Age of Onset)    Heart Disease (1) Paternal Grandfather    Osteoporosis (1) Maternal Grandfather    Thyroid Disease (1) Paternal Grandmother    Breast Cancer (1) Paternal Grandmother    Hyperlipidemia (1) Paternal Grandfather    Ovarian Cancer (1) Other (aunt)    No Known Problems (7) Mother, Father, Maternal Grandmother, Son, Son, Sister, Brother            Current Outpatient Medications   Medication Sig     levonorgestrel (MIRENA) 20 MCG/DAY IUD 1 each (20 mcg) by Intrauterine route once LOT [id number]  EXP 10/2024  NDC 57553-907-63     Multiple Vitamins-Calcium (ONE-A-DAY WOMENS PO)      triamcinolone (KENALOG) 0.1 % external cream ARLENE AA ON HANDS BID PRF FLARES     valACYclovir (VALTREX) 1000 mg tablet Take 1 tablet (1,000 mg) by mouth 2 times daily for 1 day (May use as needed for outbreaks)     No current facility-administered medications for this visit.     No Known Allergies    ROS:  12 point review of systems negative other than symptoms noted below or in the HPI.  Genitourinary: Cramps and Spotting  No urinary frequency or dysuria, bladder or kidney problems      OBJECTIVE:     /60   Wt 56.7 kg (125 lb)   LMP 03/12/2023 (Exact Date)   BMI 20.80 kg/m    Body mass index is 20.8 kg/m .    Exam:  Constitutional:  Appearance: Well nourished, well developed alert, in no acute distress  Neurologic:  Mental Status:  Oriented X3.  Normal strength and tone, sensory exam grossly normal, mentation intact and speech normal.    Psychiatric:  Mentation appears normal and affect normal/bright.  Pelvic Exam:  External Genitalia:     Normal appearance for age, no discharge present, no tenderness present, no inflammatory lesions present, color normal  Vagina:    Normal vaginal vault without central or paravaginal defects, no discharge present, no inflammatory lesions  present, no masses present  Bladder:     Nontender to palpation  Urethra:   Urethral Body:  Urethra palpation normal, urethra structural support normal   Urethral Meatus:  No erythema or lesions present  Cervix:     Appearance healthy, no lesions present, nontender to palpation, no bleeding present, string present; 3CM  Uterus:     Nontender to palpation, no masses present, position anteflexed, mobility: normal  Adnexa:     No adnexal tenderness present, no adnexal masses present  Perineum:     Perineum within normal limits, no evidence of trauma, no rashes or skin lesions present  Anus:     Anus within normal limits, no hemorrhoids present  Inguinal Lymph Nodes:     No lymphadenopathy present  Pubic Hair:     Normal pubic hair distribution for age  Genitalia and Groin:     No rashes present, no lesions present, no areas of discoloration, no masses present       In-Clinic Test Results:  No results found for this or any previous visit (from the past 24 hour(s)).    ASSESSMENT/PLAN:                                                        ICD-10-CM    1. IUD check up  Z30.431           Patient Instructions   IUD strings seen easily on exam today.  Will continue to monitor bleeding/spotting; if continues into first week of June, will return for pelvic ultrasound to further evaluate IUD location.      Rach James MD  [facility] [place]

## 2023-05-05 ENCOUNTER — OFFICE VISIT (OUTPATIENT)
Dept: OBGYN | Facility: CLINIC | Age: 38
End: 2023-05-05
Payer: COMMERCIAL

## 2023-05-05 VITALS — DIASTOLIC BLOOD PRESSURE: 60 MMHG | BODY MASS INDEX: 20.8 KG/M2 | SYSTOLIC BLOOD PRESSURE: 110 MMHG | WEIGHT: 125 LBS

## 2023-05-05 DIAGNOSIS — Z30.431 IUD CHECK UP: Primary | ICD-10-CM

## 2023-05-05 PROCEDURE — 99213 OFFICE O/P EST LOW 20 MIN: CPT | Performed by: OBSTETRICS & GYNECOLOGY

## 2023-05-05 NOTE — PATIENT INSTRUCTIONS
IUD strings seen easily on exam today.  Will continue to monitor bleeding/spotting; if continues into first week of June, will return for pelvic ultrasound to further evaluate IUD location.

## 2023-08-16 ENCOUNTER — TELEPHONE (OUTPATIENT)
Dept: OBGYN | Facility: CLINIC | Age: 38
End: 2023-08-16
Payer: COMMERCIAL

## 2023-08-16 NOTE — TELEPHONE ENCOUNTER
Patient had IUD placed May 2023    Notes since having IUD placed has had multiple issues. Is wanting to have IUD removed. Issues she has experienced is skin issues-noticing eczema on neck and chest, increase in hormonal acne on face and back, more pimples.  Spotted continuous for the first 3 months and now has spotting half the time. Period is a slow progression of bleeding and then will have a few heavier days. Patient also notes bleeding after intercourse, bleeds almost every time.   Has had weight gain and bloating as well.    Discussed can schedule office visit for removal and at that time can go over other options for birth control.    Patient transferred to scheduling to assist with scheduling office visit.      Chantal Marcelo RN on 8/16/2023 at 3:37 PM

## 2023-08-30 NOTE — PROGRESS NOTES
SUBJECTIVE:                                                   Mayelin Alexander is a 38 year old female who presents to clinic today for the following health issue(s):  Patient presents with:  IUD: Removal       HPI:  Here today for IUD removal.  Had Mirena IUD placed with me in mid March and has unfortunately had bleeding/spotting ever since.  Usually light bleeding but happening most days.  +bleeding with sex every time. No cramping or pain.  Having skin/acne issues as well  Has used pills and nuvaring in the past --remembers headaches on occasion but can't remember much else  Has also considered permanent sterilization but hates to do a surgery if there are other options    Patient's last menstrual period was 2023.    Patient is sexually active, .  Using IUD for contraception.    reports that she has never smoked. She has never used smokeless tobacco.    STD testing offered?  Declined    Health maintenance updated:  yes    Today's PHQ-2 Score:       2021    11:06 AM   PHQ-2 (  [employer])   Q1: Little interest or pleasure in doing things 0   Q2: Feeling down, depressed or hopeless 0   PHQ-2 Score 0   PHQ-2 Total Score (12-17 Years)- Positive if 3 or more points; Administer PHQ-A if positive 0     Today's PHQ-9 Score:       2023     3:21 PM   PHQ-9 SCORE   PHQ-9 Total Score MyChart 4 (Minimal depression)   PHQ-9 Total Score 4     Today's CECILIO-7 Score:       2023     3:21 PM   CECILIO-7 SCORE   Total Score 3 (minimal anxiety)   Total Score 3       Problem list and histories reviewed & adjusted, as indicated.  Additional history: as documented.    Patient Active Problem List   Diagnosis    Eczema    MEMO (stress urinary incontinence, female)    Cold sore    IUD (intrauterine device) in place     Past Surgical History:   Procedure Laterality Date     SECTION  2012    Procedure: SECTION; Surgeon:JOESPH STOUT; Location: L+D      Social History     Tobacco Use     "Smoking status: Never    Smokeless tobacco: Never   Substance Use Topics    Alcohol use: Yes      Problem (# of Occurrences) Relation (Name,Age of Onset)    Heart Disease (1) Paternal Grandfather    Osteoporosis (1) Maternal Grandfather    Thyroid Disease (1) Paternal Grandmother    Breast Cancer (1) Paternal Grandmother    Hyperlipidemia (1) Paternal Grandfather    Ovarian Cancer (1) Other (aunt)    No Known Problems (7) Mother, Father, Maternal Grandmother, Son, Son, Sister, Brother              Current Outpatient Medications   Medication Sig    levonorgestrel (MIRENA) 20 MCG/DAY IUD 1 each (20 mcg) by Intrauterine route once LOT [id number]  EXP 10/2024  NDC 37272-127-46    Multiple Vitamins-Calcium (ONE-A-DAY WOMENS PO)     triamcinolone (KENALOG) 0.1 % external cream ARLENE AA ON HANDS BID PRF FLARES    valACYclovir (VALTREX) 1000 mg tablet Take 1 tablet (1,000 mg) by mouth 2 times daily for 1 day (May use as needed for outbreaks)     No current facility-administered medications for this visit.     No Known Allergies    ROS:  12 point review of systems negative other than symptoms noted below or in the HPI.  Constitutional: Weight Gain  Genitourinary: Irregular Menses and Spotting  Skin: Acne  No urinary frequency or dysuria, bladder or kidney problems      OBJECTIVE:     /68   Ht 1.651 m (5' 5\")   Wt 57.6 kg (127 lb)   LMP 09/02/2023   BMI 21.13 kg/m    Body mass index is 21.13 kg/m .    Exam:  Constitutional:  Appearance: Well nourished, well developed alert, in no acute distress  Skin: General Inspection:  No rashes present, no lesions present, no areas of discoloration.  Neurologic:  Mental Status:  Oriented X3.  Normal strength and tone, sensory exam grossly normal, mentation intact and speech normal.    Psychiatric:  Mentation appears normal and affect normal/bright.  Pelvic Exam:  External Genitalia:     Normal appearance for age, no discharge present, no tenderness present, no inflammatory lesions " present, color normal  Vagina:    Normal vaginal vault without central or paravaginal defects, no discharge present, no inflammatory lesions present, no masses present  Bladder:     Nontender to palpation  Urethra:   Urethral Body:  Urethra palpation normal, urethra structural support normal   Urethral Meatus:  No erythema or lesions present  Cervix:     Appearance healthy, no lesions present, nontender to palpation, no bleeding present, string present  Uterus:     Nontender to palpation, no masses present, position anteflexed, mobility: normal  Adnexa:     No adnexal tenderness present, no adnexal masses present  Perineum:     Perineum within normal limits, no evidence of trauma, no rashes or skin lesions present  Anus:     Anus within normal limits, no hemorrhoids present  Inguinal Lymph Nodes:     No lymphadenopathy present  Pubic Hair:     Normal pubic hair distribution for age  Genitalia and Groin:     No rashes present, no lesions present, no areas of discoloration, no masses present     In-Clinic Test Results:  No results found for this or any previous visit (from the past 24 hour(s)).    ASSESSMENT/PLAN:                                                        ICD-10-CM    1. Encounter for removal of intrauterine contraceptive device  Z30.432 REMOVE INTRAUTERINE DEVICE          Patient Instructions   Mirena IUD removed without difficulty today.    Will follow up with me next month for yearly exam.  Will consider nuvaring for contraception and call if interested in new prescription.      Rach James MD  [facility]    IUD Removal:  SUBJECTIVE:    Is a pregnancy test required: No.  Was a consent obtained?  Yes    Mayelin Alexander is a 38 year old female,, Patient's last menstrual period was 2023. who presents today for IUD removal. Her current IUD was placed 3/21/23 ago. She has had problems including migraines, continued spotting, acne and weight gain.   with the IUD. She requests removal of the IUD because of problems stated above    Today's PHQ-2 Score:       2/9/2021    11:06 AM   PHQ-2 ( 1999 [employer])   Q1: Little interest or pleasure in doing things 0   Q2: Feeling down, depressed or hopeless 0   PHQ-2 Score 0   PHQ-2 Total Score (12-17 Years)- Positive if 3 or more points; Administer PHQ-A if positive 0       PROCEDURE:    A speculum exam was performed and the cervix was visualized. The IUD string was visualized. Using ring forceps, the string  was grasped and the IUD removed intact.    POST PROCEDURE:    The patient tolerated the procedure well. Patient was discharged in stable condition.    Call if bleeding, pain or fever occur. and Birth control counseling given.    Rach James MD

## 2023-09-04 PROBLEM — Z97.5 IUD (INTRAUTERINE DEVICE) IN PLACE: Status: ACTIVE | Noted: 2023-03-21

## 2023-09-05 ENCOUNTER — OFFICE VISIT (OUTPATIENT)
Dept: OBGYN | Facility: CLINIC | Age: 38
End: 2023-09-05
Payer: COMMERCIAL

## 2023-09-05 VITALS
WEIGHT: 127 LBS | SYSTOLIC BLOOD PRESSURE: 104 MMHG | BODY MASS INDEX: 21.16 KG/M2 | HEIGHT: 65 IN | DIASTOLIC BLOOD PRESSURE: 68 MMHG

## 2023-09-05 DIAGNOSIS — Z30.432 ENCOUNTER FOR REMOVAL OF INTRAUTERINE CONTRACEPTIVE DEVICE: Primary | ICD-10-CM

## 2023-09-05 PROCEDURE — 58301 REMOVE INTRAUTERINE DEVICE: CPT | Performed by: OBSTETRICS & GYNECOLOGY

## 2023-09-05 NOTE — PATIENT INSTRUCTIONS
Mirena IUD removed without difficulty today.    Will follow up with me next month for yearly exam.  Will consider nuvaring for contraception and call if interested in new prescription.

## 2023-10-18 DIAGNOSIS — B00.9 HSV (HERPES SIMPLEX VIRUS) INFECTION: ICD-10-CM

## 2023-10-19 RX ORDER — VALACYCLOVIR HYDROCHLORIDE 1 G/1
1000 TABLET, FILM COATED ORAL EVERY 12 HOURS
Qty: 24 TABLET | Refills: 0 | Status: SHIPPED | OUTPATIENT
Start: 2023-10-19 | End: 2023-12-07

## 2023-10-19 NOTE — TELEPHONE ENCOUNTER
"Requested Prescriptions   Pending Prescriptions Disp Refills    valACYclovir (VALTREX) 1000 mg tablet [Pharmacy Med Name: VALACYCLOVIR 1GM TABLETS] 24 tablet 3     Sig: TAKE 1 TABLET BY MOUTH EVERY 12 HOURS       Antivirals for Herpes Protocol Failed - 10/18/2023  8:31 AM        Failed - Normal serum creatinine on file in past 12 months     No lab results found.    Ok to refill medication if creatinine is low          Passed - Patient is age 12 or older        Passed - Recent (12 mo) or future (30 days) visit within the authorizing provider's specialty     Patient has had an office visit with the authorizing provider or a provider within the authorizing providers department within the previous 12 mos or has a future within next 30 days. See \"Patient Info\" tab in inbasket, or \"Choose Columns\" in Meds & Orders section of the refill encounter.              Passed - Medication is active on med list           Last Written Prescription Date:  1/6/23  Last Fill Quantity: 24,  # refills: 3   Last office visit: 9/5/2023 ; last virtual visit: Visit date not found with prescribing provider:  Jacob   Future Office Visit: 12/7/23 with Dr. James    Medication is being filled for 1 time refill only due to:  Patient needs to be seen because due for annual.  Appointment scheduled.  Erma Wolff RN on 10/19/2023 at 6:07 AM             "

## 2023-11-30 ASSESSMENT — ENCOUNTER SYMPTOMS
FEVER: 0
ARTHRALGIAS: 0
SORE THROAT: 0
WEAKNESS: 0
PARESTHESIAS: 0
SHORTNESS OF BREATH: 0
BREAST MASS: 0
HEARTBURN: 0
HEMATURIA: 0
MYALGIAS: 0
NERVOUS/ANXIOUS: 0
CONSTIPATION: 0
FREQUENCY: 0
COUGH: 0
DYSURIA: 0
NAUSEA: 0
ABDOMINAL PAIN: 0
PALPITATIONS: 0
CHILLS: 0
DIARRHEA: 0
HEADACHES: 1
EYE PAIN: 0
DIZZINESS: 0
JOINT SWELLING: 0
HEMATOCHEZIA: 0

## 2023-12-05 NOTE — PROGRESS NOTES
"  Mayelin is a 38 year old  female who presents for annual exam.     Besides routine health maintenance,  she would like to discuss std testing.    HPI:  Here today for yearly exam --doing well.  Monthly menses (q25-26d) since having IUD out in September.  Side effects from IUD resolved within first 1-2 weeks of having IUD out.  +heavy bleeding on 3/5 of period days ---had episode this summer of bleeding through pad/tampon and onto camping sleeping bag.  Some cramping.   No intermenstrual bleeding/spotting.  +SA --no issues.  Using condoms.  Has never had STD screening outside of pregnancy.  Interested today.  No bowel issues.  Still having MEMO issues --worse with periods.  Better with pilates, kegels, etc    Dating -has been with current partner x almost 1.5yrs --going well;  has met her kids but not spending any significant time with them; kids are doing well  -staying active with pilates, etc  +first mammo today; +SBE --no concerns  PCP- [facility] as needed  -hx oral cold sores --would like valtrex refilled  -declines flu and covid vaccines      GYNECOLOGIC HISTORY:    Patient's last menstrual period was 11/15/2023 (exact date).    Regular menses? yes  Menses every 25 days.  Length of menses: 5 days    Her current contraception method is: condoms.  She  reports that she has never smoked. She has never used smokeless tobacco.    Patient is sexually active.  STD testing offered?  Accepted  Last PHQ-9 score on record =       2023    11:54 AM   PHQ-9 SCORE   PHQ-9 Total Score 3     Last GAD7 score on record =       2023    11:54 AM   CECILIO-7 SCORE   Total Score 2     Alcohol Score = 2    HEALTH MAINTENANCE:  Cholesterol: (No results found for: \"CHOL\"   Last Mammo: Not applicable, Result: Not applicable, Next Mammo: Today   Pap:   Lab Results   Component Value Date    PAP NIL 2021 WNL HPV (-)neg  Colonoscopy:  NA, Result: Not applicable, Next Colonoscopy: NA years.  Dexa:  " NA    Health maintenance updated:  yes    HISTORY:  OB History    Para Term  AB Living   3 3 3 0 0 3   SAB IAB Ectopic Multiple Live Births   0 0 0 0 3      # Outcome Date GA Lbr Morgan/2nd Weight Sex Delivery Anes PTL Lv   3 Term 09/10/16 39w4d 03:30 / 02:56 3.43 kg (7 lb 9 oz) F  EPI  [place]      Name: Verónica Shrestha      Apgar1: 9  Apgar5: 9   2 Term 08 39w4d 03:35 / 03:20 3.67 kg (8 lb 1.5 oz) M  EPI  [place]      Birth Comments: pushed x 3hrs      Name: Sharla      Apgar1: 9  Apgar5: 9   1 Term 12 40w5d 08:00 / 06:13 4.451 kg (9 lb 13 oz) M CS-LTranv EPI N [place]      Birth Comments: Failure to descend; pushed x 2hrs      Name: Cale      Apgar1: 5  Apgar5: 9       Patient Active Problem List   Diagnosis    Eczema    MEMO (stress urinary incontinence, female)    Cold sore     Past Surgical History:   Procedure Laterality Date     SECTION  2012    Procedure: SECTION; Surgeon:JOESPH STOUT; Location: L+D      Social History     Tobacco Use    Smoking status: Never    Smokeless tobacco: Never   Substance Use Topics    Alcohol use: Yes      Problem (# of Occurrences) Relation (Name,Age of Onset)    Heart Disease (1) Paternal Grandfather    Osteoporosis (1) Maternal Grandfather    Thyroid Disease (1) Paternal Grandmother    Breast Cancer (1) Paternal Grandmother    Hyperlipidemia (1) Paternal Grandfather    Ovarian Cancer (1) Other (aunt)    No Known Problems (7) Mother, Father, Maternal Grandmother, Son, Son, Sister, Brother              Current Outpatient Medications   Medication Sig    Multiple Vitamins-Calcium (ONE-A-DAY WOMENS PO)     triamcinolone (KENALOG) 0.1 % external cream ARLENE AA ON HANDS BID PRF FLARES    valACYclovir (VALTREX) 1000 mg tablet TAKE 1 TABLET BY MOUTH EVERY 12 HOURS     No current facility-administered medications for this visit.     No Known Allergies    Past medical, surgical, social and family histories were reviewed and updated in EPIC.    ROS:  "  12 point review of systems negative other than symptoms noted below or in the HPI.  No urinary frequency or dysuria, bladder or kidney problems, POSITIVE for:, heavy periods    EXAM:  /68   Ht 1.664 m (5' 5.5\")   Wt 58.1 kg (128 lb)   LMP 11/15/2023 (Exact Date)   BMI 20.98 kg/m     BMI: Body mass index is 20.98 kg/m .    PHYSICAL EXAM:  Constitutional:   Appearance: Well nourished, well developed, alert, in no acute distress  Neck:  Lymph Nodes:  No lymphadenopathy present    Thyroid:  Gland size normal, nontender, no nodules or masses present  on palpation  Chest:  Respiratory Effort:  Breathing unlabored  Cardiovascular:    Heart: Auscultation:  Regular rate, normal rhythm, no murmurs present  Breasts: Palpation of Breasts and Axillae:  No masses present on palpation, no breast tenderness., Axillary Lymph Nodes:  No lymphadenopathy present., and No nodularity, asymmetry or nipple discharge bilaterally.  Gastrointestinal:   Abdominal Examination:  Abdomen nontender to palpation, tone normal without rigidity or guarding, no masses present, umbilicus without lesions   Liver and Spleen:  No hepatomegaly present, liver nontender to palpation    Hernias:  No hernias present  Lymphatic: Lymph Nodes:  No other lymphadenopathy present  Skin:  General Inspection:  No rashes present, no lesions present, no areas of  discoloration  Neurologic:    Mental Status:  Oriented X3.  Normal strength and tone, sensory exam                grossly normal, mentation intact and speech normal.    Psychiatric:   Mentation appears normal and affect normal/bright.         Pelvic Exam:  External Genitalia:     Normal appearance for age, no discharge present, no tenderness present, no inflammatory lesions present, color normal  Vagina:     Normal vaginal vault without central or paravaginal defects, no discharge present, no inflammatory lesions present, no masses present  Bladder:     Nontender to palpation  Urethra:   Urethral " Body:  Urethra palpation normal, urethra structural support normal   Urethral Meatus:  No erythema or lesions present  Cervix:     Appearance healthy, no lesions present, nontender to palpation, no bleeding present  Uterus:     Uterus: firm, normal sized and nontender, anteverted in position.   Adnexa:     No adnexal tenderness present, no adnexal masses present  Perineum:     Perineum within normal limits, no evidence of trauma, no rashes or skin lesions present  Anus:     Anus within normal limits, no hemorrhoids present  Inguinal Lymph Nodes:     No lymphadenopathy present  Pubic Hair:     Normal pubic hair distribution for age  Genitalia and Groin:     No rashes present, no lesions present, no areas of discoloration, no masses present    COUNSELING:   Reviewed preventive health counseling, as reflected in patient instructions  Special attention given to:        Regular exercise       Healthy diet/nutrition       Contraception       ([place])menopause management    BMI: Body mass index is 20.98 kg/m .      ASSESSMENT:  38 year old female with satisfactory annual exam.  No diagnosis found.    PLAN:  Patient Instructions   Follow up with your primary care provider for your other medical problems.  Continue self breast exam.  Increase physical activity and exercise.  Lab results will be called to the patient.  Usual safety and preventative measures counseling done.  Last pap smear (2021) was normal and negative for the DNA of high risk HPV subtypes.  No pap was obtained this year.  This was discussed with the patient and she agrees with the plan.   Will try Lysteda for heavy bleeding/periods.  Rx sent.  Also discussed ablation as another non-hormonal option for menorrhagia.      Rach James MD    Answers submitted by the patient for this visit:  Annual Preventive Visit (Submitted on 11/30/2023)  Chief Complaint: Annual Exam:  Frequency of exercise:: 2-3 days/week  Getting at least 3 servings of Calcium per day::  Yes  Diet:: Other  Taking medications regularly:: Yes  Medication side effects:: Not applicable  Bi-annual eye exam:: Yes  Dental care twice a year:: Yes  Sleep apnea or symptoms of sleep apnea:: None  abdominal pain: No  Blood in stool: No  Blood in urine: No  chest pain: No  chills: No  congestion: No  constipation: No  cough: No  diarrhea: No  dizziness: No  ear pain: No  eye pain: No  nervous/anxious: No  fever: No  frequency: No  genital sores: No  headaches: Yes  hearing loss: No  heartburn: No  arthralgias: No  joint swelling: No  peripheral edema: No  mood changes: No  myalgias: No  nausea: No  dysuria: No  palpitations: No  Skin sensation changes: No  sore throat: No  urgency: No  rash: Yes  shortness of breath: No  visual disturbance: No  weakness: No  pelvic pain: No  vaginal bleeding: No  vaginal discharge: No  tenderness: No  breast mass: No  Additional concerns today:: No  Exercise outside of work (Submitted on 11/30/2023)  Chief Complaint: Annual Exam:  Duration of exercise:: 15-30 minutes

## 2023-12-06 PROBLEM — Z97.5 IUD (INTRAUTERINE DEVICE) IN PLACE: Status: RESOLVED | Noted: 2023-03-21 | Resolved: 2023-12-06

## 2023-12-07 ENCOUNTER — ANCILLARY PROCEDURE (OUTPATIENT)
Dept: MAMMOGRAPHY | Facility: CLINIC | Age: 38
End: 2023-12-07
Attending: OBSTETRICS & GYNECOLOGY
Payer: COMMERCIAL

## 2023-12-07 ENCOUNTER — OFFICE VISIT (OUTPATIENT)
Dept: OBGYN | Facility: CLINIC | Age: 38
End: 2023-12-07
Attending: OBSTETRICS & GYNECOLOGY
Payer: COMMERCIAL

## 2023-12-07 VITALS
DIASTOLIC BLOOD PRESSURE: 68 MMHG | SYSTOLIC BLOOD PRESSURE: 112 MMHG | BODY MASS INDEX: 20.57 KG/M2 | HEIGHT: 66 IN | WEIGHT: 128 LBS

## 2023-12-07 DIAGNOSIS — B00.9 HSV (HERPES SIMPLEX VIRUS) INFECTION: ICD-10-CM

## 2023-12-07 DIAGNOSIS — Z11.8 SCREENING FOR CHLAMYDIAL DISEASE: ICD-10-CM

## 2023-12-07 DIAGNOSIS — N92.0 MENORRHAGIA WITH REGULAR CYCLE: ICD-10-CM

## 2023-12-07 DIAGNOSIS — Z11.3 SCREEN FOR STD (SEXUALLY TRANSMITTED DISEASE): ICD-10-CM

## 2023-12-07 DIAGNOSIS — Z01.419 ENCOUNTER FOR GYNECOLOGICAL EXAMINATION WITHOUT ABNORMAL FINDING: Primary | ICD-10-CM

## 2023-12-07 DIAGNOSIS — Z12.31 VISIT FOR SCREENING MAMMOGRAM: ICD-10-CM

## 2023-12-07 PROCEDURE — 86780 TREPONEMA PALLIDUM: CPT | Performed by: OBSTETRICS & GYNECOLOGY

## 2023-12-07 PROCEDURE — 87389 HIV-1 AG W/HIV-1&-2 AB AG IA: CPT | Performed by: OBSTETRICS & GYNECOLOGY

## 2023-12-07 PROCEDURE — 87591 N.GONORRHOEAE DNA AMP PROB: CPT | Performed by: OBSTETRICS & GYNECOLOGY

## 2023-12-07 PROCEDURE — 99396 PREV VISIT EST AGE 40-64: CPT | Performed by: OBSTETRICS & GYNECOLOGY

## 2023-12-07 PROCEDURE — 36415 COLL VENOUS BLD VENIPUNCTURE: CPT | Performed by: OBSTETRICS & GYNECOLOGY

## 2023-12-07 PROCEDURE — 87491 CHLMYD TRACH DNA AMP PROBE: CPT | Performed by: OBSTETRICS & GYNECOLOGY

## 2023-12-07 PROCEDURE — 86695 HERPES SIMPLEX TYPE 1 TEST: CPT | Performed by: OBSTETRICS & GYNECOLOGY

## 2023-12-07 PROCEDURE — 87340 HEPATITIS B SURFACE AG IA: CPT | Performed by: OBSTETRICS & GYNECOLOGY

## 2023-12-07 PROCEDURE — 77067 SCR MAMMO BI INCL CAD: CPT | Mod: TC | Performed by: RADIOLOGY

## 2023-12-07 PROCEDURE — 86803 HEPATITIS C AB TEST: CPT | Performed by: OBSTETRICS & GYNECOLOGY

## 2023-12-07 PROCEDURE — 86696 HERPES SIMPLEX TYPE 2 TEST: CPT | Performed by: OBSTETRICS & GYNECOLOGY

## 2023-12-07 RX ORDER — TRANEXAMIC ACID 650 MG/1
1300 TABLET ORAL EVERY 8 HOURS PRN
Qty: 60 TABLET | Refills: 3 | Status: SHIPPED | OUTPATIENT
Start: 2023-12-07

## 2023-12-07 RX ORDER — VALACYCLOVIR HYDROCHLORIDE 1 G/1
1000 TABLET, FILM COATED ORAL EVERY 12 HOURS
Qty: 24 TABLET | Refills: 0 | Status: SHIPPED | OUTPATIENT
Start: 2023-12-07 | End: 2024-03-27

## 2023-12-07 ASSESSMENT — PATIENT HEALTH QUESTIONNAIRE - PHQ9
SUM OF ALL RESPONSES TO PHQ QUESTIONS 1-9: 3
5. POOR APPETITE OR OVEREATING: SEVERAL DAYS

## 2023-12-07 ASSESSMENT — ANXIETY QUESTIONNAIRES
IF YOU CHECKED OFF ANY PROBLEMS ON THIS QUESTIONNAIRE, HOW DIFFICULT HAVE THESE PROBLEMS MADE IT FOR YOU TO DO YOUR WORK, TAKE CARE OF THINGS AT HOME, OR GET ALONG WITH OTHER PEOPLE: NOT DIFFICULT AT ALL
7. FEELING AFRAID AS IF SOMETHING AWFUL MIGHT HAPPEN: NOT AT ALL
2. NOT BEING ABLE TO STOP OR CONTROL WORRYING: NOT AT ALL
6. BECOMING EASILY ANNOYED OR IRRITABLE: NOT AT ALL
1. FEELING NERVOUS, ANXIOUS, OR ON EDGE: SEVERAL DAYS
3. WORRYING TOO MUCH ABOUT DIFFERENT THINGS: NOT AT ALL
GAD7 TOTAL SCORE: 2
GAD7 TOTAL SCORE: 2
5. BEING SO RESTLESS THAT IT IS HARD TO SIT STILL: NOT AT ALL

## 2023-12-07 NOTE — PATIENT INSTRUCTIONS
Follow up with your primary care provider for your other medical problems.  Continue self breast exam.  Increase physical activity and exercise.  Lab results will be called to the patient.  Usual safety and preventative measures counseling done.  Last pap smear (2021) was normal and negative for the DNA of high risk HPV subtypes.  No pap was obtained this year.  This was discussed with the patient and she agrees with the plan.   Will try Lysteda for heavy bleeding/periods.  Rx sent.  Also discussed ablation as another non-hormonal option for menorrhagia.

## 2023-12-08 LAB
C TRACH DNA SPEC QL NAA+PROBE: NEGATIVE
HBV SURFACE AG SERPL QL IA: NONREACTIVE
HCV AB SERPL QL IA: NONREACTIVE
HIV 1+2 AB+HIV1 P24 AG SERPL QL IA: NONREACTIVE
HSV1 IGG SERPL QL IA: 21.2 INDEX
HSV1 IGG SERPL QL IA: ABNORMAL
HSV2 IGG SERPL QL IA: 0.13 INDEX
HSV2 IGG SERPL QL IA: ABNORMAL
N GONORRHOEA DNA SPEC QL NAA+PROBE: NEGATIVE
T PALLIDUM AB SER QL: NONREACTIVE

## 2024-03-27 DIAGNOSIS — B00.9 HSV (HERPES SIMPLEX VIRUS) INFECTION: ICD-10-CM

## 2024-03-27 RX ORDER — VALACYCLOVIR HYDROCHLORIDE 1 G/1
TABLET, FILM COATED ORAL
Qty: 24 TABLET | Refills: 0 | Status: SHIPPED | OUTPATIENT
Start: 2024-03-27 | End: 2024-04-22

## 2024-03-27 NOTE — TELEPHONE ENCOUNTER
Requested Prescriptions   Pending Prescriptions Disp Refills    valACYclovir (VALTREX) 1000 mg tablet [Pharmacy Med Name: VALACYCLOVIR 1GM TABLETS] 24 tablet 0     Sig: TAKE 1 TABLET(1000 MG) BY MOUTH EVERY 12 HOURS       Antivirals for Herpes Protocol Failed - 3/27/2024 12:18 PM        Failed - Normal serum creatinine on file in past 12 months     No lab results found.              Passed - Patient is age 12 or older        Passed - Recent (12 mo) or future (30 days) visit within the authorizing provider's specialty     The patient must have completed an in-person or virtual visit within the past 12 months or has a future visit scheduled within the next 90 days with the authorizing provider s specialty.  Urgent care and e-visits do not quality as an office visit for this protocol.          Passed - Medication is active on med list           Last Written Prescription Date:  12/7/23  Last Fill Quantity: 24,  # refills: 0   Last office visit: 12/7/2023 ; last virtual visit: Visit date not found with prescribing provider:  Dr James   Future Office Visit:    Prescription approved per [facility] Refill Protocol.  Madison Wu RN on 3/27/2024 at 12:35 PM

## 2024-04-20 DIAGNOSIS — B00.9 HSV (HERPES SIMPLEX VIRUS) INFECTION: ICD-10-CM

## 2024-04-22 RX ORDER — VALACYCLOVIR HYDROCHLORIDE 1 G/1
TABLET, FILM COATED ORAL
Qty: 24 TABLET | Refills: 0 | Status: SHIPPED | OUTPATIENT
Start: 2024-04-22 | End: 2024-10-04

## 2024-04-22 NOTE — TELEPHONE ENCOUNTER
Requested Prescriptions   Pending Prescriptions Disp Refills    valACYclovir (VALTREX) 1000 mg tablet [Pharmacy Med Name: VALACYCLOVIR 1GM TABLETS] 24 tablet 0     Sig: TAKE 1 TABLET(1000 MG) BY MOUTH EVERY 12 HOURS       Antivirals Passed - 4/20/2024  1:09 PM        Passed - Patient is age 12 or older        Passed - Medication is active on med list        Passed - Recent (12 mo) or future (90 days) visit within the authorizing provider's specialty     The patient must have completed an in-person or virtual visit within the past 12 months or has a future visit scheduled within the next 90 days with the authorizing provider s specialty.  Urgent care and e-visits do not quality as an office visit for this protocol.          Passed - Medication indicated for associated diagnosis     Medication is associated with one or more of the following diagnoses:     Genital herpes simplex   Herpes simples   Herpes zoster, shingles   Varicella   Recurrent herpes simplex labialis   HIV infection   Varicella-zoster virus infection, prophylaxis             Last Written Prescription Date:  3/27/24  Last Fill Quantity: 24,  # refills: 0   Last office visit: 12/7/2023 ; last virtual visit: Visit date not found with prescribing provider:  Jacob   Future Office Visit:  NONE    Prescription approved per [facility] Refill Protocol.  Erma Wolff RN on 4/22/2024 at 6:06 AM

## 2024-08-21 DIAGNOSIS — B00.9 HSV (HERPES SIMPLEX VIRUS) INFECTION: ICD-10-CM

## 2024-08-23 RX ORDER — VALACYCLOVIR HYDROCHLORIDE 1 G/1
TABLET, FILM COATED ORAL
Qty: 24 TABLET | Refills: 0 | OUTPATIENT
Start: 2024-08-23

## 2024-08-23 NOTE — TELEPHONE ENCOUNTER
Requested Prescriptions   Pending Prescriptions Disp Refills    valACYclovir (VALTREX) 1000 mg tablet [Pharmacy Med Name: VALACYCLOVIR 1GM TABLETS] 24 tablet 0     Sig: TAKE 1 TABLET(1000 MG) BY MOUTH EVERY 12 HOURS       Antivirals Passed - 8/21/2024  6:10 PM        Passed - Patient is age 12 or older        Passed - Medication is active on med list        Passed - Recent (12 mo) or future (90 days) visit within the authorizing provider's specialty     The patient must have completed an in-person or virtual visit within the past 12 months or has a future visit scheduled within the next 90 days with the authorizing provider s specialty.  Urgent care and e-visits do not quality as an office visit for this protocol.          Passed - Medication indicated for associated diagnosis     Medication is associated with one or more of the following diagnoses:     Genital herpes simplex   Herpes simples   Herpes zoster, shingles   Varicella   Recurrent herpes simplex labialis   HIV infection   Varicella-zoster virus infection, prophylaxis             Last Written Prescription Date:  4/22/24  Last Fill Quantity: 24,  # refills: 0   Last office visit: 12/7/2023 ; last virtual visit: Visit date not found with prescribing provider:  Jacob   Future Office Visit:      Refill request refused due to:  [x]Refills available at pharmacy. Request sent back to pharmacy as duplicate.  []Patient reports no longer needing medication.  []Medication discontinued.   Noam Almazan RN on 8/23/2024 at 4:28 PM   [facility]

## 2024-10-03 DIAGNOSIS — B00.9 HSV (HERPES SIMPLEX VIRUS) INFECTION: ICD-10-CM

## 2024-10-04 RX ORDER — VALACYCLOVIR HYDROCHLORIDE 1 G/1
TABLET, FILM COATED ORAL
Qty: 24 TABLET | Refills: 0 | Status: SHIPPED | OUTPATIENT
Start: 2024-10-04

## 2024-10-04 NOTE — TELEPHONE ENCOUNTER
Requested Prescriptions   Pending Prescriptions Disp Refills    valACYclovir (VALTREX) 1000 mg tablet [Pharmacy Med Name: VALACYCLOVIR 1GM TABLETS] 24 tablet 0     Sig: TAKE 1 TABLET(1000 MG) BY MOUTH EVERY 12 HOURS       Antivirals Passed - 10/3/2024  5:58 PM        Passed - Patient is age 12 or older        Passed - Medication is active on med list        Passed - Recent (12 mo) or future (90 days) visit within the authorizing provider's specialty     The patient must have completed an in-person or virtual visit within the past 12 months or has a future visit scheduled within the next 90 days with the authorizing provider s specialty.  Urgent care and e-visits do not quality as an office visit for this protocol.          Passed - Medication indicated for associated diagnosis     Medication is associated with one or more of the following diagnoses:     Genital herpes simplex   Herpes simples   Herpes zoster, shingles   Varicella   Recurrent herpes simplex labialis   HIV infection   Varicella-zoster virus infection, prophylaxis             Last Written Prescription Date:  4/22/24  Last Fill Quantity: 24,  # refills: 0   Last office visit: 12/7/2023 ; last virtual visit: Visit date not found with prescribing provider:  Dr James   Future Office Visit:  none    Prescription approved per [facility] Refill Protocol.  Madison Wu RN on 10/4/2024 at 8:33 AM

## 2024-10-06 RX ORDER — SPIRONOLACTONE 50 MG/1
50 TABLET, FILM COATED ORAL DAILY
Qty: 90 TABLET | OUTPATIENT
Start: 2024-10-06

## 2024-10-07 NOTE — TELEPHONE ENCOUNTER
Provider not at [facility]/ patient not seen@ [facility].Obtain correct provider contact information

## 2024-11-07 ENCOUNTER — PATIENT OUTREACH (OUTPATIENT)
Dept: CARE COORDINATION | Facility: CLINIC | Age: 39
End: 2024-11-07
Payer: COMMERCIAL

## 2024-11-21 ENCOUNTER — PATIENT OUTREACH (OUTPATIENT)
Dept: CARE COORDINATION | Facility: CLINIC | Age: 39
End: 2024-11-21
Payer: COMMERCIAL

## 2025-01-11 ENCOUNTER — HEALTH MAINTENANCE LETTER (OUTPATIENT)
Age: 40
End: 2025-01-11

## 2025-03-20 ENCOUNTER — VIRTUAL VISIT (OUTPATIENT)
Dept: FAMILY MEDICINE | Facility: CLINIC | Age: 40
End: 2025-03-20
Payer: COMMERCIAL

## 2025-03-20 DIAGNOSIS — L08.9 LOCAL INFECTION OF SKIN AND SUBCUTANEOUS TISSUE: Primary | ICD-10-CM

## 2025-03-20 RX ORDER — CEPHALEXIN 500 MG/1
500 CAPSULE ORAL 3 TIMES DAILY
Qty: 21 CAPSULE | Refills: 0 | Status: SHIPPED | OUTPATIENT
Start: 2025-03-20

## 2025-03-20 NOTE — PROGRESS NOTES
Mayelin is a 39 year old who is being evaluated via a billable video visit.    How would you like to obtain your AVS? MyChart  If the video visit is dropped, the invitation should be resent by: Text to cell phone: [phone]  Will anyone else be joining your video visit? No      Assessment & Plan     Local infection of skin and subcutaneous tissue  Treat for skin infection with Keflex. Continue topical antifungal to cover for possible tinea pedis. Follow up in 1-2 weeks if not improving or sooner if worsening.  - cephALEXin (KEFLEX) 500 MG capsule; Take 1 capsule (500 mg) by mouth 3 times daily.      Subjective   Mayelin is a 39 year old, presenting for the following health issues:  Video Visit    History of Present Illness       Reason for visit:  Pain/redness on foot in between little toe and its neighbor toe  Symptom onset:  3-4 weeks ago  Symptoms include:  Pain, redness, tenderness and a hard spot-puss under the skin  Symptom intensity:  Moderate  Symptom progression:  Worsening  Had these symptoms before:  No  What makes it worse:  Shoes/walking  What makes it better:  No   She is taking medications regularly.    Pt has tried OTC antifungal/cortisone cream mix - slightly improving       6-8 weeks ago noticed some discomfort between 4th and 5th toes on the top of the foot, not between the toes. But over the past week, has worsened and skin getting red. She has been applying antibacterial cream and antifungal cream twice per day and was helping but over the past few days, worsening. Sees an area that might have pus - like a white head.         Objective           Vitals:  No vitals were obtained today due to virtual visit.    Physical Exam   GENERAL: alert and no distress  SKIN: appears to have some redness between 4th and 5th toes on top of foot with small area of swelling        Video-Visit Details    Type of service:  Video Visit   Originating Location (pt. Location): Home    Distant Location (provider  location):  On-site  Platform used for Video Visit: Ilda    The longitudinal plan of care for the diagnosis(es)/condition(s) as documented were addressed during this visit. Due to the added complexity in care, I will continue to support Mayelin in the subsequent management and with ongoing continuity of care.    Signed Electronically by: Cricket Petersen DO

## 2025-06-05 ENCOUNTER — PATIENT OUTREACH (OUTPATIENT)
Dept: CARE COORDINATION | Facility: CLINIC | Age: 40
End: 2025-06-05
Payer: COMMERCIAL